# Patient Record
Sex: FEMALE | Race: WHITE | Employment: FULL TIME | URBAN - METROPOLITAN AREA
[De-identification: names, ages, dates, MRNs, and addresses within clinical notes are randomized per-mention and may not be internally consistent; named-entity substitution may affect disease eponyms.]

---

## 2018-03-09 ENCOUNTER — HOSPITAL ENCOUNTER (OUTPATIENT)
Dept: CT IMAGING | Age: 48
Discharge: HOME | End: 2018-03-09
Attending: FAMILY MEDICINE
Payer: COMMERCIAL

## 2018-03-09 DIAGNOSIS — R91.8 ABNORMAL CHEST X-RAY WITH MULTIPLE LUNG NODULES: ICD-10-CM

## 2018-03-09 PROCEDURE — 71250 CT THORAX DX C-: CPT

## 2018-10-06 ENCOUNTER — HOSPITAL ENCOUNTER (OUTPATIENT)
Dept: CT IMAGING | Age: 48
Discharge: HOME | End: 2018-10-06
Attending: FAMILY MEDICINE
Payer: COMMERCIAL

## 2018-10-06 DIAGNOSIS — R91.8 MULTIPLE LUNG NODULES ON CT: ICD-10-CM

## 2018-10-06 PROCEDURE — 71250 CT THORAX DX C-: CPT

## 2018-10-08 NOTE — PROGRESS NOTES
Your CT shows stable and small nodules. Recommend a repeat in 1 year just because of you personal history of cigarette use.

## 2019-01-08 PROBLEM — E66.01 SEVERE OBESITY (HCC): Status: ACTIVE | Noted: 2019-01-08

## 2019-06-26 ENCOUNTER — HOSPITAL ENCOUNTER (OUTPATIENT)
Dept: MAMMOGRAPHY | Age: 49
Discharge: HOME | End: 2019-06-26
Attending: FAMILY MEDICINE
Payer: COMMERCIAL

## 2019-06-26 PROCEDURE — 77067 SCR MAMMO BI INCL CAD: CPT

## 2020-02-05 PROBLEM — R91.8 MULTIPLE LUNG NODULES ON CT: Status: ACTIVE | Noted: 2020-02-05

## 2020-02-05 PROBLEM — Z87.891 HISTORY OF TOBACCO USE: Status: ACTIVE | Noted: 2020-02-05

## 2020-02-05 PROBLEM — N89.8 VAGINAL DRYNESS: Status: ACTIVE | Noted: 2020-02-05

## 2020-02-05 PROBLEM — N39.41 URGE INCONTINENCE: Status: ACTIVE | Noted: 2020-02-05

## 2020-05-06 PROBLEM — R10.9 ABDOMINAL WALL PAIN: Status: ACTIVE | Noted: 2020-05-06

## 2020-05-06 PROBLEM — K59.04 CHRONIC IDIOPATHIC CONSTIPATION: Status: ACTIVE | Noted: 2020-05-06

## 2020-08-27 ENCOUNTER — HOSPITAL ENCOUNTER (OUTPATIENT)
Dept: CT IMAGING | Age: 50
Discharge: HOME | End: 2020-08-27
Attending: FAMILY MEDICINE
Payer: COMMERCIAL

## 2020-08-27 DIAGNOSIS — R91.8 MULTIPLE LUNG NODULES ON CT: ICD-10-CM

## 2020-08-27 PROCEDURE — 71250 CT THORAX DX C-: CPT

## 2021-07-12 PROBLEM — R10.9 ABDOMINAL WALL PAIN: Status: RESOLVED | Noted: 2020-05-06 | Resolved: 2021-07-12

## 2022-02-07 PROBLEM — R74.01 ELEVATED ALT MEASUREMENT: Status: ACTIVE | Noted: 2022-02-07

## 2022-03-18 PROBLEM — E66.01 SEVERE OBESITY (HCC): Status: ACTIVE | Noted: 2019-01-08

## 2022-03-19 PROBLEM — Z87.891 HISTORY OF TOBACCO USE: Status: ACTIVE | Noted: 2020-02-05

## 2022-03-19 PROBLEM — K59.04 CHRONIC IDIOPATHIC CONSTIPATION: Status: ACTIVE | Noted: 2020-05-06

## 2022-03-19 PROBLEM — N39.41 URGE INCONTINENCE: Status: ACTIVE | Noted: 2020-02-05

## 2022-03-19 PROBLEM — N89.8 VAGINAL DRYNESS: Status: ACTIVE | Noted: 2020-02-05

## 2022-03-20 PROBLEM — R74.01 ELEVATED ALT MEASUREMENT: Status: ACTIVE | Noted: 2022-02-07

## 2022-03-20 PROBLEM — R91.8 MULTIPLE LUNG NODULES ON CT: Status: ACTIVE | Noted: 2020-02-05

## 2022-06-22 ENCOUNTER — TELEPHONE (OUTPATIENT)
Dept: FAMILY MEDICINE CLINIC | Facility: CLINIC | Age: 52
End: 2022-06-22

## 2022-06-22 RX ORDER — PANTOPRAZOLE SODIUM 20 MG/1
TABLET, DELAYED RELEASE ORAL
Qty: 90 TABLET | Refills: 0 | Status: SHIPPED | OUTPATIENT
Start: 2022-06-22 | End: 2022-08-15

## 2022-06-22 NOTE — TELEPHONE ENCOUNTER
States that she needs a refill of heartburn medication, but is unsure of the name of this medication. Upon looking at her med list, it seems that she is on Pantoprazole 20 mg. Last OV: 2/3/2022  Next OV: 8/5/2022    Last written: 3/28/2022  For: 90 with 0 refills.

## 2022-06-27 RX ORDER — FLUOXETINE HYDROCHLORIDE 40 MG/1
CAPSULE ORAL
Qty: 180 CAPSULE | Refills: 0 | Status: SHIPPED | OUTPATIENT
Start: 2022-06-27 | End: 2022-09-20

## 2022-07-28 RX ORDER — MONTELUKAST SODIUM 10 MG/1
TABLET ORAL
Qty: 90 TABLET | Refills: 0 | Status: SHIPPED | OUTPATIENT
Start: 2022-07-28 | End: 2022-09-22

## 2022-07-28 RX ORDER — TRAZODONE HYDROCHLORIDE 100 MG/1
TABLET ORAL
Qty: 90 TABLET | Refills: 0 | Status: SHIPPED | OUTPATIENT
Start: 2022-07-28 | End: 2022-09-22

## 2022-07-28 RX ORDER — FOLIC ACID 1 MG/1
TABLET ORAL
Qty: 90 TABLET | Refills: 0 | Status: SHIPPED | OUTPATIENT
Start: 2022-07-28 | End: 2022-09-22

## 2022-08-05 ENCOUNTER — OFFICE VISIT (OUTPATIENT)
Dept: FAMILY MEDICINE CLINIC | Facility: CLINIC | Age: 52
End: 2022-08-05
Payer: COMMERCIAL

## 2022-08-05 VITALS
DIASTOLIC BLOOD PRESSURE: 70 MMHG | HEART RATE: 66 BPM | WEIGHT: 258.4 LBS | OXYGEN SATURATION: 97 % | HEIGHT: 69 IN | SYSTOLIC BLOOD PRESSURE: 122 MMHG | RESPIRATION RATE: 18 BRPM | BODY MASS INDEX: 38.27 KG/M2

## 2022-08-05 DIAGNOSIS — N39.41 URGE INCONTINENCE: ICD-10-CM

## 2022-08-05 DIAGNOSIS — Z87.891 PERSONAL HISTORY OF TOBACCO USE: ICD-10-CM

## 2022-08-05 DIAGNOSIS — Z12.11 COLON CANCER SCREENING: ICD-10-CM

## 2022-08-05 DIAGNOSIS — L85.3 DRY SKIN: ICD-10-CM

## 2022-08-05 DIAGNOSIS — R74.01 ELEVATED ALT MEASUREMENT: Primary | ICD-10-CM

## 2022-08-05 DIAGNOSIS — G47.00 INSOMNIA, UNSPECIFIED TYPE: ICD-10-CM

## 2022-08-05 DIAGNOSIS — L30.4 INTERTRIGO: ICD-10-CM

## 2022-08-05 DIAGNOSIS — F41.9 ANXIETY: ICD-10-CM

## 2022-08-05 DIAGNOSIS — E78.2 MIXED HYPERLIPIDEMIA: ICD-10-CM

## 2022-08-05 DIAGNOSIS — H02.844 EDEMA OF LEFT UPPER EYELID: ICD-10-CM

## 2022-08-05 DIAGNOSIS — F33.42 RECURRENT MAJOR DEPRESSIVE DISORDER, IN FULL REMISSION (HCC): ICD-10-CM

## 2022-08-05 DIAGNOSIS — L24.9 IRRITANT CONTACT DERMATITIS, UNSPECIFIED TRIGGER: ICD-10-CM

## 2022-08-05 DIAGNOSIS — Z12.31 ENCOUNTER FOR SCREENING MAMMOGRAM FOR BREAST CANCER: ICD-10-CM

## 2022-08-05 LAB
ALBUMIN SERPL-MCNC: 3.7 G/DL (ref 3.5–5)
ALBUMIN/GLOB SERPL: 1.2 {RATIO} (ref 1.2–3.5)
ALP SERPL-CCNC: 100 U/L (ref 50–136)
ALT SERPL-CCNC: 28 U/L (ref 12–65)
AST SERPL-CCNC: 19 U/L (ref 15–37)
BILIRUB DIRECT SERPL-MCNC: <0.1 MG/DL
BILIRUB SERPL-MCNC: 0.3 MG/DL (ref 0.2–1.1)
FERRITIN SERPL-MCNC: 31 NG/ML (ref 8–388)
GLOBULIN SER CALC-MCNC: 3.2 G/DL (ref 2.3–3.5)
HBV SURFACE AG SER QL: NONREACTIVE
HCV AB SER QL: NONREACTIVE
IRON SERPL-MCNC: 46 UG/DL (ref 35–150)
PROT SERPL-MCNC: 6.9 G/DL (ref 6.3–8.2)
T4 FREE SERPL-MCNC: 1 NG/DL (ref 0.78–1.46)
TSH, 3RD GENERATION: 2.54 UIU/ML (ref 0.36–3.74)

## 2022-08-05 PROCEDURE — G8427 DOCREV CUR MEDS BY ELIG CLIN: HCPCS | Performed by: FAMILY MEDICINE

## 2022-08-05 PROCEDURE — G8417 CALC BMI ABV UP PARAM F/U: HCPCS | Performed by: FAMILY MEDICINE

## 2022-08-05 PROCEDURE — 1036F TOBACCO NON-USER: CPT | Performed by: FAMILY MEDICINE

## 2022-08-05 PROCEDURE — 99214 OFFICE O/P EST MOD 30 MIN: CPT | Performed by: FAMILY MEDICINE

## 2022-08-05 PROCEDURE — 3017F COLORECTAL CA SCREEN DOC REV: CPT | Performed by: FAMILY MEDICINE

## 2022-08-05 RX ORDER — NYSTATIN 100000 [USP'U]/G
POWDER TOPICAL
Qty: 60 G | Refills: 5 | Status: SHIPPED | OUTPATIENT
Start: 2022-08-05

## 2022-08-05 RX ORDER — BETAMETHASONE VALERATE 0.1 %
LOTION (ML) TOPICAL
Qty: 60 ML | Refills: 1 | Status: SHIPPED | OUTPATIENT
Start: 2022-08-05

## 2022-08-05 ASSESSMENT — PATIENT HEALTH QUESTIONNAIRE - PHQ9
SUM OF ALL RESPONSES TO PHQ QUESTIONS 1-9: 0
SUM OF ALL RESPONSES TO PHQ QUESTIONS 1-9: 0
1. LITTLE INTEREST OR PLEASURE IN DOING THINGS: 0
2. FEELING DOWN, DEPRESSED OR HOPELESS: 0
SUM OF ALL RESPONSES TO PHQ9 QUESTIONS 1 & 2: 0
SUM OF ALL RESPONSES TO PHQ QUESTIONS 1-9: 0
SUM OF ALL RESPONSES TO PHQ QUESTIONS 1-9: 0

## 2022-08-05 ASSESSMENT — ANXIETY QUESTIONNAIRES
GAD7 TOTAL SCORE: 4
1. FEELING NERVOUS, ANXIOUS, OR ON EDGE: 0
3. WORRYING TOO MUCH ABOUT DIFFERENT THINGS: 1
4. TROUBLE RELAXING: 0
5. BEING SO RESTLESS THAT IT IS HARD TO SIT STILL: 1
IF YOU CHECKED OFF ANY PROBLEMS ON THIS QUESTIONNAIRE, HOW DIFFICULT HAVE THESE PROBLEMS MADE IT FOR YOU TO DO YOUR WORK, TAKE CARE OF THINGS AT HOME, OR GET ALONG WITH OTHER PEOPLE: SOMEWHAT DIFFICULT
7. FEELING AFRAID AS IF SOMETHING AWFUL MIGHT HAPPEN: 1
2. NOT BEING ABLE TO STOP OR CONTROL WORRYING: 1
6. BECOMING EASILY ANNOYED OR IRRITABLE: 0

## 2022-08-05 ASSESSMENT — ENCOUNTER SYMPTOMS
CONSTIPATION: 1
BLOOD IN STOOL: 0
SHORTNESS OF BREATH: 0
NAUSEA: 0
ABDOMINAL PAIN: 0
COUGH: 0
VOMITING: 0

## 2022-08-05 NOTE — PATIENT INSTRUCTIONS

## 2022-08-05 NOTE — PROGRESS NOTES
[address] [facility],[address], DO  [address], [address] [facility] [address]   No:  [phone]  Fax:  [phone]        Assessment/Plan:   Ren Soulier was seen today for anxiety and gastroesophageal reflux. Diagnoses and all orders for this visit:    Elevated ALT measurement  She had very slight elevation in ALT at last appointment. Await repeat today with lab work. -     TSH; Future  -     T4, Free; Future  -     Hepatitis B Surface Antigen; Future  -     Hepatitis C Antibody; Future  -     Hepatic Function Panel; Future  -     Ferritin; Future  -     Iron; Future  -     Soluble transferrin receptor; Future  -     Soluble transferrin receptor  -     Iron  -     Ferritin  -     Hepatic Function Panel  -     Hepatitis C Antibody  -     Hepatitis B Surface Antigen  -     T4, Free  -     TSH    Urge incontinence  Continue Vesicare. Advised patient this could be contributing some to her dry mucous membranes, but she does feel that it is efficacious for her bladder would like to continue. Irritant contact dermatitis, unspecified trigger  Prescribing betamethasone. Advise she also use Vaseline to the irritated areas at night. She is trying to use barriers between her bra and then when at work because she has such a physical and hot job  -     betamethasone valerate (VALISONE) 0.1 % lotion; Use topically twice daily as needed to irritated or itchy skin/rash. Do not use for more than 7 days in a row    Intertrigo  Patient to apply nystatin to the skin fold as needed. -     nystatin (MYCOSTATIN) 660163 UNIT/GM powder; Apply powder to skin folds 3 times a day as needed for rash    Dry skin  Await thyroid levels. -     TSH; Future  -     T4, Free; Future  -     Hepatitis B Surface Antigen; Future  -     Hepatitis C Antibody; Future  -     Hepatic Function Panel; Future  -     Ferritin; Future  -     Iron; Future  -     Soluble transferrin receptor;  Future  -     Soluble transferrin receptor  -     Iron  -     Ferritin  -     Hepatic Function Panel  -     Hepatitis C Antibody  -     Hepatitis B Surface Antigen  -     T4, Free  -     TSH    Edema of left upper eyelid  Mild edema noted on exam today, no mass appreciated. Advised patient to follow-up with ophthalmology for further recommendations, advised cool compress and continue allergy medications    Personal history of tobacco use  She has history of multiple lung nodules that were found to be benign after serial follow-up which was completed in 2020. However she quit smoking 2016 and she is agreeable to LDCT lung per guidelines. -     CT Lung Screen (Annual); Future    Recurrent major depressive disorder, in full remission ([facility] [address]  Stable. Continue trazodone, fluoxetine    Anxiety  Stable. Continue fluoxetine. She is going to see what psychiatrist is in her network and contact the office for referral if needed. She will also contact the office if she would like to see a counselor    Colon cancer screening  -     [facility]    Encounter for screening mammogram for breast cancer  -     [facility] Digital Screen Bilateral [[id number]]; Future    Insomnia, unspecified type  Stable. Continue trazodone. Mixed hyperlipidemia  She had lipid panel drawn 6 months ago. assess again at follow-up in 6 months. Kristine Arce is a 49 y.o. female who is seen for evaluation of   Chief Complaint   Patient presents with    Anxiety    Gastroesophageal Reflux       HPI:   She is here today to follow-up chronic issues including anxiety, depression, acid reflux. She is looking to get a new job. She is looking for a place that will pay more. She is probably going to stay at [facility] part-time at least.  If she is agreeable to screening as long as it is affordable. She has some swelling of the left upper eyelid. It is worse in the morning and decreases by the end of the day.   She does not feel a mass or pain or itch.  She does flush her eyes quite a bit at work, but her right eye has not bothered. She has rash under her breasts, in her groin skin folds and then on her neck in the side of her body where her bra is. She is tried different bras, she has tried different types of material.  She says a lot of this is due to working in a hot environment and a lot of sweating. She works very hard to stay hydrated through water, Gatorade, electrolyte popsicles, frozen watermelon and watermelon. She is still struggling some with anxiety and depression. She did not end up seeing psychiatry. She states it was too expensive. She is not sure for somebody else in her network but she will look into that. She will think about counseling. She is having a lot of difficulty with dry skin especially her lips. Review of Systems:  Review of Systems   Constitutional:  Negative for chills, fever and unexpected weight change. Respiratory:  Negative for cough and shortness of breath. Cardiovascular:  Negative for chest pain and leg swelling. Gastrointestinal:  Positive for constipation (chronic). Negative for abdominal pain, blood in stool, nausea and vomiting. Skin:  Positive for rash.        History:  Past Medical History:   Diagnosis Date    Abnormal Pap smear of cervix     Anxiety     Depression     Hypertension     Lung nodule     BENIGN, stable with serial 2 yr f/u     Obesity        Past Surgical History:   Procedure Laterality Date    BUNIONECTOMY  2005     SECTION      x 3    GYN      Cervical cryosurgery for abnormal cells    IN ABDOMEN SURGERY PROC UNLISTED      after wieght loss    TONSILLECTOMY AND ADENOIDECTOMY      TUBAL LIGATION         Current Outpatient Medications   Medication Sig Dispense Refill    nystatin (MYCOSTATIN) 427684 UNIT/GM powder Apply powder to skin folds 3 times a day as needed for rash 60 g 5    betamethasone valerate (VALISONE) 0.1 % lotion Use topically twice daily as needed to 08/05/22 0852   BP: 122/70   Site: Left Upper Arm   Position: Sitting   Pulse: 66   Resp: 18   SpO2: 97%   Weight: 258 lb 6.4 oz (117.2 kg)   Height: 5' 9\" (1.753 m)          Physical Exam:  Physical Exam  Vitals reviewed. Constitutional:       Appearance: Normal appearance. HENT:      Head: Normocephalic and atraumatic. Cardiovascular:      Rate and Rhythm: Normal rate and regular rhythm. Heart sounds: No murmur heard. Pulmonary:      Effort: Pulmonary effort is normal. No respiratory distress. Breath sounds: Normal breath sounds. No wheezing or rhonchi. Abdominal:      General: There is no distension. Palpations: There is no mass. Tenderness: There is no abdominal tenderness. There is no right CVA tenderness, left CVA tenderness, guarding or rebound. Hernia: No hernia is present. Musculoskeletal:      Cervical back: Neck supple. Right lower leg: No edema. Left lower leg: No edema. Lymphadenopathy:      Cervical: No cervical adenopathy. Skin:     General: Skin is warm and dry. Comments: Linear rash on the right lateral neck where her sports bra lies. Pink rash on the left wrist where her watch lies. Bumpy erythematous rash under the breast.   Neurological:      Mental Status: She is alert. Psychiatric:         Mood and Affect: Mood normal.         Behavior: Behavior normal.           Kindra Santoyo DO    This note was generated using Dragon voice recognition software.   There may be medical errors due to computer generated translation

## 2022-08-07 LAB — TRANSFERRIN SERPL-SCNC: 20.6 NMOL/L (ref 12.2–27.3)

## 2022-08-15 RX ORDER — PANTOPRAZOLE SODIUM 20 MG/1
TABLET, DELAYED RELEASE ORAL
Qty: 90 TABLET | Refills: 1 | Status: SHIPPED | OUTPATIENT
Start: 2022-08-15

## 2022-09-20 RX ORDER — FLUOXETINE HYDROCHLORIDE 40 MG/1
CAPSULE ORAL
Qty: 180 CAPSULE | Refills: 1 | Status: SHIPPED | OUTPATIENT
Start: 2022-09-20

## 2022-09-22 RX ORDER — MONTELUKAST SODIUM 10 MG/1
TABLET ORAL
Qty: 90 TABLET | Refills: 0 | Status: SHIPPED | OUTPATIENT
Start: 2022-09-22

## 2022-09-22 RX ORDER — FOLIC ACID 1 MG/1
TABLET ORAL
Qty: 90 TABLET | Refills: 0 | Status: SHIPPED | OUTPATIENT
Start: 2022-09-22

## 2022-09-22 RX ORDER — TRAZODONE HYDROCHLORIDE 100 MG/1
TABLET ORAL
Qty: 90 TABLET | Refills: 0 | Status: SHIPPED | OUTPATIENT
Start: 2022-09-22

## 2022-10-14 RX ORDER — PANTOPRAZOLE SODIUM 20 MG/1
TABLET, DELAYED RELEASE ORAL
Qty: 90 TABLET | Refills: 0 | Status: CANCELLED | OUTPATIENT
Start: 2022-10-14

## 2022-10-14 RX ORDER — SOLIFENACIN SUCCINATE 5 MG/1
5 TABLET, FILM COATED ORAL DAILY
Qty: 90 TABLET | Refills: 0 | Status: SHIPPED | OUTPATIENT
Start: 2022-10-14

## 2022-10-14 NOTE — TELEPHONE ENCOUNTER
Pt called for refill on Protonix and Vesicare.   [place], [place]  Protonix sent 8/15/22 # 90 1 refill  Vesicare sent 4/28/22 # 90 no refill  Appt 2/7

## 2022-10-14 NOTE — TELEPHONE ENCOUNTER
90-day supply with a refill of pantoprazole was sent to her mail order pharmacy 2 months ago. Is she no longer using the mail order pharmacy?

## 2022-10-18 RX ORDER — ESTRADIOL 10 UG/1
INSERT VAGINAL
Qty: 24 TABLET | Refills: 0 | Status: SHIPPED | OUTPATIENT
Start: 2022-10-18 | End: 2022-12-07

## 2022-12-07 RX ORDER — ESTRADIOL 10 UG/1
INSERT VAGINAL
Qty: 24 TABLET | Refills: 0 | Status: SHIPPED | OUTPATIENT
Start: 2022-12-07

## 2022-12-16 RX ORDER — MONTELUKAST SODIUM 10 MG/1
TABLET ORAL
Qty: 90 TABLET | Refills: 0 | Status: SHIPPED | OUTPATIENT
Start: 2022-12-16

## 2022-12-16 RX ORDER — TRAZODONE HYDROCHLORIDE 100 MG/1
TABLET ORAL
Qty: 90 TABLET | Refills: 0 | Status: SHIPPED | OUTPATIENT
Start: 2022-12-16

## 2022-12-16 RX ORDER — FOLIC ACID 1 MG/1
TABLET ORAL
Qty: 90 TABLET | Refills: 0 | Status: SHIPPED | OUTPATIENT
Start: 2022-12-16

## 2023-01-23 RX ORDER — SOLIFENACIN SUCCINATE 5 MG/1
TABLET, FILM COATED ORAL
Qty: 90 TABLET | Refills: 0 | Status: SHIPPED | OUTPATIENT
Start: 2023-01-23

## 2023-02-07 ENCOUNTER — OFFICE VISIT (OUTPATIENT)
Dept: FAMILY MEDICINE CLINIC | Facility: CLINIC | Age: 53
End: 2023-02-07
Payer: COMMERCIAL

## 2023-02-07 VITALS
HEART RATE: 75 BPM | WEIGHT: 280 LBS | BODY MASS INDEX: 41.47 KG/M2 | DIASTOLIC BLOOD PRESSURE: 88 MMHG | SYSTOLIC BLOOD PRESSURE: 138 MMHG | OXYGEN SATURATION: 97 % | RESPIRATION RATE: 16 BRPM | HEIGHT: 69 IN

## 2023-02-07 DIAGNOSIS — G47.00 INSOMNIA, UNSPECIFIED TYPE: ICD-10-CM

## 2023-02-07 DIAGNOSIS — Z87.891 HISTORY OF TOBACCO USE: ICD-10-CM

## 2023-02-07 DIAGNOSIS — Z12.11 COLON CANCER SCREENING: ICD-10-CM

## 2023-02-07 DIAGNOSIS — I10 ESSENTIAL HYPERTENSION: Primary | ICD-10-CM

## 2023-02-07 DIAGNOSIS — F41.9 ANXIETY: ICD-10-CM

## 2023-02-07 DIAGNOSIS — F33.0 MILD EPISODE OF RECURRENT MAJOR DEPRESSIVE DISORDER (HCC): ICD-10-CM

## 2023-02-07 DIAGNOSIS — E66.01 OBESITY, CLASS III, BMI 40-49.9 (MORBID OBESITY) (HCC): ICD-10-CM

## 2023-02-07 DIAGNOSIS — E78.2 MIXED HYPERLIPIDEMIA: ICD-10-CM

## 2023-02-07 PROBLEM — F33.42 RECURRENT MAJOR DEPRESSIVE DISORDER, IN FULL REMISSION (HCC): Status: ACTIVE | Noted: 2023-02-07

## 2023-02-07 PROBLEM — R74.01 ELEVATED ALT MEASUREMENT: Status: RESOLVED | Noted: 2022-02-07 | Resolved: 2023-02-07

## 2023-02-07 LAB
BASOPHILS # BLD: 0 K/UL (ref 0–0.2)
BASOPHILS NFR BLD: 1 % (ref 0–2)
DIFFERENTIAL METHOD BLD: NORMAL
EOSINOPHIL # BLD: 0.2 K/UL (ref 0–0.8)
EOSINOPHIL NFR BLD: 3 % (ref 0.5–7.8)
ERYTHROCYTE [DISTWIDTH] IN BLOOD BY AUTOMATED COUNT: 12.9 % (ref 11.9–14.6)
EST. AVERAGE GLUCOSE BLD GHB EST-MCNC: 108 MG/DL
HBA1C MFR BLD: 5.4 % (ref 4.8–5.6)
HCT VFR BLD AUTO: 38.2 % (ref 35.8–46.3)
HGB BLD-MCNC: 12.3 G/DL (ref 11.7–15.4)
IMM GRANULOCYTES # BLD AUTO: 0 K/UL (ref 0–0.5)
IMM GRANULOCYTES NFR BLD AUTO: 0 % (ref 0–5)
LYMPHOCYTES # BLD: 1.5 K/UL (ref 0.5–4.6)
LYMPHOCYTES NFR BLD: 30 % (ref 13–44)
MCH RBC QN AUTO: 29.8 PG (ref 26.1–32.9)
MCHC RBC AUTO-ENTMCNC: 32.2 G/DL (ref 31.4–35)
MCV RBC AUTO: 92.5 FL (ref 82–102)
MONOCYTES # BLD: 0.4 K/UL (ref 0.1–1.3)
MONOCYTES NFR BLD: 7 % (ref 4–12)
NEUTS SEG # BLD: 2.8 K/UL (ref 1.7–8.2)
NEUTS SEG NFR BLD: 59 % (ref 43–78)
NRBC # BLD: 0 K/UL (ref 0–0.2)
PLATELET # BLD AUTO: 290 K/UL (ref 150–450)
PMV BLD AUTO: 12.2 FL (ref 9.4–12.3)
RBC # BLD AUTO: 4.13 M/UL (ref 4.05–5.2)
WBC # BLD AUTO: 4.9 K/UL (ref 4.3–11.1)

## 2023-02-07 PROCEDURE — G8417 CALC BMI ABV UP PARAM F/U: HCPCS | Performed by: FAMILY MEDICINE

## 2023-02-07 PROCEDURE — 3017F COLORECTAL CA SCREEN DOC REV: CPT | Performed by: FAMILY MEDICINE

## 2023-02-07 PROCEDURE — 99214 OFFICE O/P EST MOD 30 MIN: CPT | Performed by: FAMILY MEDICINE

## 2023-02-07 PROCEDURE — 3078F DIAST BP <80 MM HG: CPT | Performed by: FAMILY MEDICINE

## 2023-02-07 PROCEDURE — G8427 DOCREV CUR MEDS BY ELIG CLIN: HCPCS | Performed by: FAMILY MEDICINE

## 2023-02-07 PROCEDURE — 1036F TOBACCO NON-USER: CPT | Performed by: FAMILY MEDICINE

## 2023-02-07 PROCEDURE — G8484 FLU IMMUNIZE NO ADMIN: HCPCS | Performed by: FAMILY MEDICINE

## 2023-02-07 PROCEDURE — 3074F SYST BP LT 130 MM HG: CPT | Performed by: FAMILY MEDICINE

## 2023-02-07 RX ORDER — HYDROCHLOROTHIAZIDE 25 MG/1
25 TABLET ORAL EVERY MORNING
Qty: 90 TABLET | Refills: 1 | Status: SHIPPED | OUTPATIENT
Start: 2023-02-07

## 2023-02-07 SDOH — ECONOMIC STABILITY: FOOD INSECURITY: WITHIN THE PAST 12 MONTHS, YOU WORRIED THAT YOUR FOOD WOULD RUN OUT BEFORE YOU GOT MONEY TO BUY MORE.: SOMETIMES TRUE

## 2023-02-07 SDOH — ECONOMIC STABILITY: HOUSING INSECURITY
IN THE LAST 12 MONTHS, WAS THERE A TIME WHEN YOU DID NOT HAVE A STEADY PLACE TO SLEEP OR SLEPT IN A SHELTER (INCLUDING NOW)?: NO

## 2023-02-07 SDOH — ECONOMIC STABILITY: FOOD INSECURITY: WITHIN THE PAST 12 MONTHS, THE FOOD YOU BOUGHT JUST DIDN'T LAST AND YOU DIDN'T HAVE MONEY TO GET MORE.: NEVER TRUE

## 2023-02-07 SDOH — ECONOMIC STABILITY: INCOME INSECURITY: HOW HARD IS IT FOR YOU TO PAY FOR THE VERY BASICS LIKE FOOD, HOUSING, MEDICAL CARE, AND HEATING?: SOMEWHAT HARD

## 2023-02-07 ASSESSMENT — ENCOUNTER SYMPTOMS
NAUSEA: 0
BLOOD IN STOOL: 0
COUGH: 0
VOMITING: 0
ABDOMINAL PAIN: 0

## 2023-02-07 NOTE — PROGRESS NOTES
[address] [facility],[address], DO  [address], [address] [facility] [address]  Ph No:  [phone]  Fax:  [phone]        Assessment/Plan:   Brian Lyons was seen today for anxiety, depression and weight management. Diagnoses and all orders for this visit:    Essential hypertension  New problem. On recheck today blood pressure 13 8/88. She notes elevated blood pressure between appointments. Starting HCTZ. Follow-up in 6 weeks to reevaluate. Await today's nonfasting labs. -     CBC with Auto Differential; Future  -     Comprehensive Metabolic Panel; Future  -     Lipid Panel; Future  -     Hemoglobin A1C; Future  -     TSH; Future  -     hydroCHLOROthiazide (HYDRODIURIL) 25 MG tablet; Take 1 tablet by mouth every morning  -     TSH  -     Hemoglobin A1C  -     Lipid Panel  -     Comprehensive Metabolic Panel  -     CBC with Auto Differential    Mild episode of recurrent major depressive disorder (HCC)  Currently using fluoxetine and trazodone. She is concerned that weight gain is causing the problems with mood. Though, we did discuss today that the uncontrolled mood does not help with weight loss especially if she is using food as comfort. Referring her to psychiatry and counseling. Advised patient she can also reach out to her previous counselor if she prefers. -     External Referral to Psychiatry  -     External Referral To Counseling Services    Anxiety  Continue fluoxetine and trazodone. Referring her to psychiatry and counseling. Obesity, Class III, BMI 40-49.9 (morbid obesity) ([facility] [address]  Referring her to bariatric surgeon. Did discuss that I feel that getting improvement in her mood is important at this time to help with some of her motivation. Advised patient that medications are available for weight loss such as UBFCAX, but she should see if this is covered by her insurance  -     CBC with Auto Differential; Future  -     Comprehensive Metabolic Panel;  Future  -     Lipid Panel; Future  -     Hemoglobin A1C; Future  -     TSH; Future  -     External Referral to Psychiatry  -     [facility]  -     TSH  -     Hemoglobin A1C  -     Lipid Panel  -     Comprehensive Metabolic Panel  -     CBC with Auto Differential    Mixed hyperlipidemia  Await today's nonfasting labs. Insomnia, unspecified type  Continue trazodone. Colon cancer screening  Provided her with the contact information for gastroenterology to schedule colonoscopy    History of tobacco use  History of serial CTs for multiple lung nodules with history of smoking. She quit smoking in 2016. She completed the serial CTs, benign findings however she still qualifies for lung cancer screening. This was ordered several months ago. She was provided the contact information to the hospital to schedule the CT. Labs:  No results found for this visit on 02/07/23. Office Visit on 08/05/2022   Component Date Value Ref Range Status    Soluble Transferrin Recept 08/05/2022 20.6  12.2 - 27.3 nmol/L Final    Comment: (NOTE)  Performed At: [facility] & [address] [id number]  Mani Adam MD [id number]      Iron 08/05/2022 46  35 - 150 ug/dL Final    Comment: Known Interfering Substances section:  \"Iron values may be falsely elevated in  serum samples from patients with  anticoagulants (e.g., hemodialysis patients). \"  Limitations of Procedure section:  \"Turbidity resulting from precipitation of  fibrinogen in the serum of patients treated  with anticoagulants (e.g. hemodialysis  patients) may cause spuriously elevated  iron results. \"      Ferritin 08/05/2022 31  8 - 388 NG/ML Final    Total Protein 08/05/2022 6.9  6.3 - 8.2 g/dL Final    Albumin 08/05/2022 3.7  3.5 - 5.0 g/dL Final    Globulin 08/05/2022 3.2  2.3 - 3.5 g/dL Final    Albumin/Globulin Ratio 08/05/2022 1.2  1.2 - 3.5   Final    Total Bilirubin 08/05/2022 0.3  0.2 - 1.1 MG/DL Final    Bilirubin, Direct 08/05/2022 <0.1  <0.4 MG/DL Final    Alk Phosphatase 08/05/2022 100  50 - 136 U/L Final    AST 08/05/2022 19  15 - 37 U/L Final    ALT 08/05/2022 28  12 - 65 U/L Final    Hepatitis C Ab 08/05/2022 NONREACTIVE  NONREACTIVE   Final    Hepatitis B Surface Ag 08/05/2022 NONREACTIVE  NONREACTIVE   Final    T4 Free 08/05/2022 1.0  0.78 - 1.46 NG/DL Final    TSH, 3RD GENERATION 08/05/2022 2.540  0.358 - 3.740 uIU/mL Final           Adriana Kurtz is a 52 y.o. female who is seen for evaluation of   Chief Complaint   Patient presents with    Anxiety    Depression    Weight Management       HPI:   She feels that her blood pressure has been elevated.  She is having headaches.  She states the other day she checked her blood pressure was 146 systolically.  She does not have a home blood pressure monitor.  But she did check it at [facility].  She is gained weight.  She feels that the weight gain is affecting her mood.  But she also notes that lack of motivation is affecting her ability to lose weight, exercise, eat right.  She notes a \"lack of willpower\".  She states her family will cook or bring in foods that do not help her with weight loss and she will consume it.  She is try the adrian NOOM but still feels that it was not beneficial for helping her weight loss.  She did not end up reaching out to psychiatry as she had intended to do so at last appointment.  She does feel that the fluoxetine is overall beneficial compared to previous.  She states that there is some concerns for bills but she is not stressing out about them like she would have in the past without the medication.  She does note that cost is somewhat of a concern in regards to seeing specialist.  She did not schedule mammogram or colonoscopy yet.  She has seen a counselor in the past.  She has not seen the counselor in a while.  She does not remember the name but she knows where the office is to reach out to if needed.  She states that her counselor was  concerned that she has history of PTSD from childhood trauma. Review of Systems:  Review of Systems   Constitutional:  Positive for fatigue. Negative for chills and fever. Respiratory:  Negative for cough. Cardiovascular:  Negative for chest pain. Gastrointestinal:  Negative for abdominal pain, blood in stool, nausea and vomiting. History:  Past Medical History:   Diagnosis Date    Abnormal Pap smear of cervix     Anxiety     Depression     Hypertension     Lung nodule     BENIGN, stable with serial 2 yr f/u     Obesity        Past Surgical History:   Procedure Laterality Date    BUNIONECTOMY  2005     SECTION      x 3    GYN      Cervical cryosurgery for abnormal cells    MT UNLISTED PROCEDURE ABDOMEN PERITONEUM & OMENTUM      after wieght loss    TONSILLECTOMY AND ADENOIDECTOMY      TUBAL LIGATION         Current Outpatient Medications   Medication Sig Dispense Refill    hydroCHLOROthiazide (HYDRODIURIL) 25 MG tablet Take 1 tablet by mouth every morning 90 tablet 1    solifenacin (VESICARE) 5 MG tablet TAKE 1 TABLET BY MOUTH  DAILY FOR A CONDITION WHERE THE URGE TO URINATE RESULTS IN URINE LEAKAGE 90 tablet 0    montelukast (SINGULAIR) 10 MG tablet TAKE 1 TABLET BY MOUTH AT  NIGHT FOR ALLERGIES 90 tablet 0    traZODone (DESYREL) 100 MG tablet TAKE 1 TABLET BY MOUTH AT  NIGHT 90 tablet 0    folic acid (FOLVITE) 1 MG tablet TAKE 1 TABLET BY MOUTH  DAILY FOR INADEQUATE FOLIC  ACID 90 tablet 0    FLUoxetine (PROZAC) 40 MG capsule TAKE 1 CAPSULE BY MOUTH  TWICE DAILY 180 capsule 1    pantoprazole (PROTONIX) 20 MG tablet TAKE 1 TABLET BY MOUTH  DAILY BEFORE BREAKFAST FOR  HEARTBURN 90 tablet 1    nystatin (MYCOSTATIN) 896888 UNIT/GM powder Apply powder to skin folds 3 times a day as needed for rash 60 g 5    betamethasone valerate (VALISONE) 0.1 % lotion Use topically twice daily as needed to irritated or itchy skin/rash.   Do not use for more than 7 days in a row 60 mL 1    albuterol sulfate HFA 108 (90 Base) MCG/ACT inhaler Inhale 2 puffs into the lungs every 4 hours as needed      albuterol (PROVENTIL) (2.5 MG/3ML) 0.083% nebulizer solution Inhale 2.5 mg into the lungs every 4 hours as needed      ascorbic acid (VITAMIN C) 500 MG tablet Take 500 mg by mouth daily      fexofenadine (ALLEGRA) 180 MG tablet Take 180 mg by mouth daily as needed      fluticasone (FLONASE) 50 MCG/ACT nasal spray 2 sprays by Nasal route daily       No current facility-administered medications for this visit. Immunization History   Administered Date(s) Administered    Influenza Virus Vaccine 10/22/2018, 02/05/2020    Influenza, FLUARIX, FLULAVAL, FLUZONE (age 10 mo+) AND AFLURIA, (age 1 y+), PF, 0.5mL 02/05/2020    Influenza, FLUCELVAX, (age 10 mo+), MDCK, PF, 0.5mL 10/04/2021    Influenza, Triv, 3 Years and older, IM, PF (Afluria 5yrs and older) 10/14/2016    Influenza, Trivalent PF 10/14/2016    Tdap (Boostrix, Adacel) 07/05/2018, 11/03/2020    Zoster Recombinant (Shingrix) 02/03/2022, 04/04/2022             Vitals:    Vitals:    02/07/23 0853 02/07/23 0912   BP: 124/76 138/88   Site: Left Upper Arm    Position: Sitting    Pulse: 75    Resp: 16    SpO2: 97%    Weight: 280 lb (127 kg)    Height: 5' 9\" (1.753 m)           Physical Exam:  Physical Exam  Vitals reviewed. Constitutional:       Appearance: Normal appearance. HENT:      Head: Normocephalic and atraumatic. Cardiovascular:      Rate and Rhythm: Normal rate and regular rhythm. Heart sounds: No murmur heard. Pulmonary:      Effort: Pulmonary effort is normal. No respiratory distress. Breath sounds: Normal breath sounds. No wheezing or rhonchi. Abdominal:      General: There is no distension. Palpations: There is no mass. Tenderness: There is no abdominal tenderness. There is no right CVA tenderness, left CVA tenderness, guarding or rebound. Hernia: No hernia is present. Musculoskeletal:      Cervical back: Neck supple.       Right lower leg: No edema. Left lower leg: No edema. Lymphadenopathy:      Cervical: No cervical adenopathy. Skin:     General: Skin is warm and dry. Neurological:      Mental Status: She is alert. Psychiatric:         Mood and Affect: Mood normal.         Behavior: Behavior normal.           Almas Rausch DO    This note was generated using Dragon voice recognition software.   There may be medical errors due to computer generated translation

## 2023-02-07 NOTE — PATIENT INSTRUCTIONS
Contact the hospital to schedule the CT scan of the lungs and mammogram.  Contact [facility] for psychiatry appointment. Contact [facility] to schedule the colonoscopy. Some of these are screening and should be covered quite well by your insurance.

## 2023-02-08 LAB
ALBUMIN SERPL-MCNC: 3.8 G/DL (ref 3.5–5)
ALBUMIN/GLOB SERPL: 1.2 (ref 0.4–1.6)
ALP SERPL-CCNC: 94 U/L (ref 50–136)
ALT SERPL-CCNC: 21 U/L (ref 12–65)
ANION GAP SERPL CALC-SCNC: 9 MMOL/L (ref 2–11)
AST SERPL-CCNC: 15 U/L (ref 15–37)
BILIRUB SERPL-MCNC: 0.3 MG/DL (ref 0.2–1.1)
BUN SERPL-MCNC: 15 MG/DL (ref 6–23)
CALCIUM SERPL-MCNC: 9.4 MG/DL (ref 8.3–10.4)
CHLORIDE SERPL-SCNC: 107 MMOL/L (ref 101–110)
CHOLEST SERPL-MCNC: 245 MG/DL
CO2 SERPL-SCNC: 25 MMOL/L (ref 21–32)
CREAT SERPL-MCNC: 0.9 MG/DL (ref 0.6–1)
GLOBULIN SER CALC-MCNC: 3.3 G/DL (ref 2.8–4.5)
GLUCOSE SERPL-MCNC: 100 MG/DL (ref 65–100)
HDLC SERPL-MCNC: 51 MG/DL (ref 40–60)
HDLC SERPL: 4.8
LDLC SERPL CALC-MCNC: 163 MG/DL
POTASSIUM SERPL-SCNC: 4.6 MMOL/L (ref 3.5–5.1)
PROT SERPL-MCNC: 7.1 G/DL (ref 6.3–8.2)
SODIUM SERPL-SCNC: 141 MMOL/L (ref 133–143)
TRIGL SERPL-MCNC: 155 MG/DL (ref 35–150)
TSH, 3RD GENERATION: 2.74 UIU/ML (ref 0.36–3.74)
VLDLC SERPL CALC-MCNC: 31 MG/DL (ref 6–23)

## 2023-02-22 RX ORDER — PANTOPRAZOLE SODIUM 20 MG/1
TABLET, DELAYED RELEASE ORAL
Qty: 90 TABLET | Refills: 0 | Status: SHIPPED | OUTPATIENT
Start: 2023-02-22

## 2023-02-23 RX ORDER — ESTRADIOL 10 UG/1
INSERT VAGINAL
Qty: 24 TABLET | Refills: 3 | OUTPATIENT
Start: 2023-02-23

## 2023-03-12 RX ORDER — MONTELUKAST SODIUM 10 MG/1
TABLET ORAL
Qty: 90 TABLET | Refills: 0 | Status: SHIPPED | OUTPATIENT
Start: 2023-03-12

## 2023-03-12 RX ORDER — FOLIC ACID 1 MG/1
TABLET ORAL
Qty: 90 TABLET | Refills: 0 | Status: SHIPPED | OUTPATIENT
Start: 2023-03-12

## 2023-03-12 RX ORDER — TRAZODONE HYDROCHLORIDE 100 MG/1
TABLET ORAL
Qty: 90 TABLET | Refills: 0 | Status: SHIPPED | OUTPATIENT
Start: 2023-03-12

## 2023-03-12 RX ORDER — FLUOXETINE HYDROCHLORIDE 40 MG/1
CAPSULE ORAL
Qty: 180 CAPSULE | Refills: 0 | Status: SHIPPED | OUTPATIENT
Start: 2023-03-12

## 2023-03-29 ENCOUNTER — OFFICE VISIT (OUTPATIENT)
Dept: FAMILY MEDICINE CLINIC | Facility: CLINIC | Age: 53
End: 2023-03-29
Payer: COMMERCIAL

## 2023-03-29 VITALS
OXYGEN SATURATION: 99 % | HEIGHT: 69 IN | SYSTOLIC BLOOD PRESSURE: 136 MMHG | DIASTOLIC BLOOD PRESSURE: 88 MMHG | BODY MASS INDEX: 42.45 KG/M2 | HEART RATE: 84 BPM | WEIGHT: 286.6 LBS | RESPIRATION RATE: 16 BRPM

## 2023-03-29 DIAGNOSIS — I10 ESSENTIAL HYPERTENSION: ICD-10-CM

## 2023-03-29 DIAGNOSIS — H65.01 NON-RECURRENT ACUTE SEROUS OTITIS MEDIA OF RIGHT EAR: Primary | ICD-10-CM

## 2023-03-29 DIAGNOSIS — J30.2 SEASONAL ALLERGIES: ICD-10-CM

## 2023-03-29 PROCEDURE — G8417 CALC BMI ABV UP PARAM F/U: HCPCS | Performed by: FAMILY MEDICINE

## 2023-03-29 PROCEDURE — 3017F COLORECTAL CA SCREEN DOC REV: CPT | Performed by: FAMILY MEDICINE

## 2023-03-29 PROCEDURE — 1036F TOBACCO NON-USER: CPT | Performed by: FAMILY MEDICINE

## 2023-03-29 PROCEDURE — 3079F DIAST BP 80-89 MM HG: CPT | Performed by: FAMILY MEDICINE

## 2023-03-29 PROCEDURE — 99214 OFFICE O/P EST MOD 30 MIN: CPT | Performed by: FAMILY MEDICINE

## 2023-03-29 PROCEDURE — 3075F SYST BP GE 130 - 139MM HG: CPT | Performed by: FAMILY MEDICINE

## 2023-03-29 PROCEDURE — G8427 DOCREV CUR MEDS BY ELIG CLIN: HCPCS | Performed by: FAMILY MEDICINE

## 2023-03-29 PROCEDURE — G8484 FLU IMMUNIZE NO ADMIN: HCPCS | Performed by: FAMILY MEDICINE

## 2023-03-29 RX ORDER — FLUTICASONE PROPIONATE 50 MCG
2 SPRAY, SUSPENSION (ML) NASAL DAILY
Qty: 3 EACH | Refills: 3 | Status: SHIPPED | OUTPATIENT
Start: 2023-03-29

## 2023-03-29 RX ORDER — FEXOFENADINE HCL 180 MG/1
180 TABLET ORAL DAILY PRN
Qty: 90 TABLET | Refills: 3 | Status: SHIPPED | OUTPATIENT
Start: 2023-03-29

## 2023-03-29 RX ORDER — LOSARTAN POTASSIUM AND HYDROCHLOROTHIAZIDE 12.5; 1 MG/1; MG/1
1 TABLET ORAL DAILY
Qty: 90 TABLET | Refills: 1 | Status: SHIPPED | OUTPATIENT
Start: 2023-03-29

## 2023-03-29 RX ORDER — LOSARTAN POTASSIUM AND HYDROCHLOROTHIAZIDE 12.5; 1 MG/1; MG/1
1 TABLET ORAL DAILY
Qty: 30 TABLET | Refills: 0 | Status: SHIPPED | OUTPATIENT
Start: 2023-03-29 | End: 2023-03-29 | Stop reason: SDUPTHER

## 2023-03-29 ASSESSMENT — ENCOUNTER SYMPTOMS
BLOOD IN STOOL: 0
ABDOMINAL PAIN: 0
VOMITING: 0
NAUSEA: 0

## 2023-03-29 ASSESSMENT — PATIENT HEALTH QUESTIONNAIRE - PHQ9
SUM OF ALL RESPONSES TO PHQ9 QUESTIONS 1 & 2: 1
SUM OF ALL RESPONSES TO PHQ QUESTIONS 1-9: 1
SUM OF ALL RESPONSES TO PHQ QUESTIONS 1-9: 1
2. FEELING DOWN, DEPRESSED OR HOPELESS: 1
SUM OF ALL RESPONSES TO PHQ QUESTIONS 1-9: 1
1. LITTLE INTEREST OR PLEASURE IN DOING THINGS: 0
SUM OF ALL RESPONSES TO PHQ QUESTIONS 1-9: 1

## 2023-03-29 NOTE — PROGRESS NOTES
doxycycline, a cough syrup and prednisone. But since then she still having ongoing ear pressure. Review of Systems:  Review of Systems   Constitutional:  Negative for chills, fever and unexpected weight change. Cardiovascular:  Negative for chest pain and leg swelling. Gastrointestinal:  Negative for abdominal pain, blood in stool, nausea and vomiting.        History:  Past Medical History:   Diagnosis Date    Abnormal Pap smear of cervix     Anxiety     Depression     Hypertension     Lung nodule     BENIGN, stable with serial 2 yr f/u     Obesity        Past Surgical History:   Procedure Laterality Date    BUNIONECTOMY  2005     SECTION      x 3    GYN      Cervical cryosurgery for abnormal cells    OK UNLISTED PROCEDURE ABDOMEN PERITONEUM & OMENTUM      after wieght loss    TONSILLECTOMY AND ADENOIDECTOMY      TUBAL LIGATION         Current Outpatient Medications   Medication Sig Dispense Refill    fluticasone (FLONASE) 50 MCG/ACT nasal spray 2 sprays by Nasal route daily 3 each 3    fexofenadine (ALLEGRA) 180 MG tablet Take 1 tablet by mouth daily as needed (allergies) 90 tablet 3    losartan-hydroCHLOROthiazide (HYZAAR) 100-12.5 MG per tablet Take 1 tablet by mouth daily 90 tablet 1    montelukast (SINGULAIR) 10 MG tablet TAKE 1 TABLET BY MOUTH AT NIGHT  FOR ALLERGIES 90 tablet 0    FLUoxetine (PROZAC) 40 MG capsule TAKE 1 CAPSULE BY MOUTH  TWICE DAILY 180 capsule 0    traZODone (DESYREL) 100 MG tablet TAKE 1 TABLET BY MOUTH AT NIGHT 90 tablet 0    folic acid (FOLVITE) 1 MG tablet TAKE 1 TABLET BY MOUTH DAILY FOR INADEQUATE FOLIC ACID 90 tablet 0    pantoprazole (PROTONIX) 20 MG tablet TAKE 1 TABLET BY MOUTH  DAILY BEFORE BREAKFAST FOR  HEARTBURN 90 tablet 0    solifenacin (VESICARE) 5 MG tablet TAKE 1 TABLET BY MOUTH  DAILY FOR A CONDITION WHERE THE URGE TO URINATE RESULTS IN URINE LEAKAGE 90 tablet 0    nystatin (MYCOSTATIN) 192235 UNIT/GM powder Apply powder to skin folds 3 times a day as

## 2023-03-31 ENCOUNTER — HOSPITAL ENCOUNTER (OUTPATIENT)
Dept: CT IMAGING | Age: 53
Discharge: HOME | End: 2023-03-31
Payer: COMMERCIAL

## 2023-03-31 DIAGNOSIS — Z87.891 PERSONAL HISTORY OF TOBACCO USE: ICD-10-CM

## 2023-03-31 PROCEDURE — 71271 CT THORAX LUNG CANCER SCR C-: CPT

## 2023-04-05 ENCOUNTER — TELEPHONE (OUTPATIENT)
Dept: FAMILY MEDICINE CLINIC | Facility: CLINIC | Age: 53
End: 2023-04-05

## 2023-04-05 ENCOUNTER — NURSE ONLY (OUTPATIENT)
Dept: FAMILY MEDICINE CLINIC | Facility: CLINIC | Age: 53
End: 2023-04-05

## 2023-04-05 DIAGNOSIS — I10 ESSENTIAL HYPERTENSION: ICD-10-CM

## 2023-04-06 LAB
ANION GAP SERPL CALC-SCNC: 6 MMOL/L (ref 2–11)
BUN SERPL-MCNC: 19 MG/DL (ref 6–23)
CALCIUM SERPL-MCNC: 8.9 MG/DL (ref 8.3–10.4)
CHLORIDE SERPL-SCNC: 103 MMOL/L (ref 101–110)
CO2 SERPL-SCNC: 28 MMOL/L (ref 21–32)
CREAT SERPL-MCNC: 1 MG/DL (ref 0.6–1)
GLUCOSE SERPL-MCNC: 110 MG/DL (ref 65–100)
POTASSIUM SERPL-SCNC: 4.2 MMOL/L (ref 3.5–5.1)
SODIUM SERPL-SCNC: 137 MMOL/L (ref 133–143)

## 2023-05-17 ENCOUNTER — OFFICE VISIT (OUTPATIENT)
Dept: FAMILY MEDICINE CLINIC | Facility: CLINIC | Age: 53
End: 2023-05-17
Payer: COMMERCIAL

## 2023-05-17 VITALS
DIASTOLIC BLOOD PRESSURE: 94 MMHG | OXYGEN SATURATION: 98 % | BODY MASS INDEX: 41.9 KG/M2 | HEIGHT: 69 IN | WEIGHT: 282.9 LBS | RESPIRATION RATE: 16 BRPM | SYSTOLIC BLOOD PRESSURE: 132 MMHG | HEART RATE: 86 BPM

## 2023-05-17 DIAGNOSIS — L20.84 INTRINSIC ECZEMA: ICD-10-CM

## 2023-05-17 DIAGNOSIS — Z11.51 ENCOUNTER FOR SCREENING FOR HUMAN PAPILLOMAVIRUS (HPV): ICD-10-CM

## 2023-05-17 DIAGNOSIS — I10 ESSENTIAL HYPERTENSION: ICD-10-CM

## 2023-05-17 DIAGNOSIS — N95.0 POST-MENOPAUSE BLEEDING: Primary | ICD-10-CM

## 2023-05-17 DIAGNOSIS — Z12.4 CERVICAL CANCER SCREENING: ICD-10-CM

## 2023-05-17 PROCEDURE — G8417 CALC BMI ABV UP PARAM F/U: HCPCS | Performed by: FAMILY MEDICINE

## 2023-05-17 PROCEDURE — G8427 DOCREV CUR MEDS BY ELIG CLIN: HCPCS | Performed by: FAMILY MEDICINE

## 2023-05-17 PROCEDURE — 3017F COLORECTAL CA SCREEN DOC REV: CPT | Performed by: FAMILY MEDICINE

## 2023-05-17 PROCEDURE — 3075F SYST BP GE 130 - 139MM HG: CPT | Performed by: FAMILY MEDICINE

## 2023-05-17 PROCEDURE — 3080F DIAST BP >= 90 MM HG: CPT | Performed by: FAMILY MEDICINE

## 2023-05-17 PROCEDURE — 99214 OFFICE O/P EST MOD 30 MIN: CPT | Performed by: FAMILY MEDICINE

## 2023-05-17 PROCEDURE — 1036F TOBACCO NON-USER: CPT | Performed by: FAMILY MEDICINE

## 2023-05-17 RX ORDER — BETAMETHASONE DIPROPIONATE 0.05 %
OINTMENT (GRAM) TOPICAL
Qty: 45 G | Refills: 1 | Status: SHIPPED | OUTPATIENT
Start: 2023-05-17

## 2023-05-17 RX ORDER — LAMOTRIGINE 25 MG/1
TABLET ORAL
COMMUNITY
Start: 2023-04-26

## 2023-05-17 RX ORDER — AMLODIPINE BESYLATE 5 MG/1
5 TABLET ORAL DAILY
Qty: 90 TABLET | Refills: 0 | Status: SHIPPED | OUTPATIENT
Start: 2023-05-17

## 2023-05-17 ASSESSMENT — ENCOUNTER SYMPTOMS
BLOOD IN STOOL: 0
NAUSEA: 0
VOMITING: 0
COUGH: 0
SHORTNESS OF BREATH: 0
ABDOMINAL PAIN: 0

## 2023-05-17 ASSESSMENT — PATIENT HEALTH QUESTIONNAIRE - PHQ9
SUM OF ALL RESPONSES TO PHQ QUESTIONS 1-9: 7
2. FEELING DOWN, DEPRESSED OR HOPELESS: 2
SUM OF ALL RESPONSES TO PHQ QUESTIONS 1-9: 7
8. MOVING OR SPEAKING SO SLOWLY THAT OTHER PEOPLE COULD HAVE NOTICED. OR THE OPPOSITE, BEING SO FIGETY OR RESTLESS THAT YOU HAVE BEEN MOVING AROUND A LOT MORE THAN USUAL: 0
SUM OF ALL RESPONSES TO PHQ QUESTIONS 1-9: 7
SUM OF ALL RESPONSES TO PHQ9 QUESTIONS 1 & 2: 2
9. THOUGHTS THAT YOU WOULD BE BETTER OFF DEAD, OR OF HURTING YOURSELF: 0
1. LITTLE INTEREST OR PLEASURE IN DOING THINGS: 0
5. POOR APPETITE OR OVEREATING: 0
7. TROUBLE CONCENTRATING ON THINGS, SUCH AS READING THE NEWSPAPER OR WATCHING TELEVISION: 1
4. FEELING TIRED OR HAVING LITTLE ENERGY: 2
SUM OF ALL RESPONSES TO PHQ QUESTIONS 1-9: 7
6. FEELING BAD ABOUT YOURSELF - OR THAT YOU ARE A FAILURE OR HAVE LET YOURSELF OR YOUR FAMILY DOWN: 1
3. TROUBLE FALLING OR STAYING ASLEEP: 1
10. IF YOU CHECKED OFF ANY PROBLEMS, HOW DIFFICULT HAVE THESE PROBLEMS MADE IT FOR YOU TO DO YOUR WORK, TAKE CARE OF THINGS AT HOME, OR GET ALONG WITH OTHER PEOPLE: 0

## 2023-05-17 NOTE — PROGRESS NOTES
ointment Apply topically daily as needed for rash 45 g 1    amLODIPine (NORVASC) 5 MG tablet Take 1 tablet by mouth daily 90 tablet 0    solifenacin (VESICARE) 5 MG tablet TAKE 1 TABLET BY MOUTH DAILY FOR A CONDITION WHERE THE URGE TO  URINATE RESULTS IN URINE LEAKAGE 90 tablet 0    fluticasone (FLONASE) 50 MCG/ACT nasal spray 2 sprays by Nasal route daily 3 each 3    fexofenadine (ALLEGRA) 180 MG tablet Take 1 tablet by mouth daily as needed (allergies) 90 tablet 3    losartan-hydroCHLOROthiazide (HYZAAR) 100-12.5 MG per tablet Take 1 tablet by mouth daily 90 tablet 1    montelukast (SINGULAIR) 10 MG tablet TAKE 1 TABLET BY MOUTH AT NIGHT  FOR ALLERGIES 90 tablet 0    FLUoxetine (PROZAC) 40 MG capsule TAKE 1 CAPSULE BY MOUTH  TWICE DAILY 180 capsule 0    traZODone (DESYREL) 100 MG tablet TAKE 1 TABLET BY MOUTH AT NIGHT 90 tablet 0    folic acid (FOLVITE) 1 MG tablet TAKE 1 TABLET BY MOUTH DAILY FOR INADEQUATE FOLIC ACID 90 tablet 0    pantoprazole (PROTONIX) 20 MG tablet TAKE 1 TABLET BY MOUTH  DAILY BEFORE BREAKFAST FOR  HEARTBURN 90 tablet 0    nystatin (MYCOSTATIN) 494047 UNIT/GM powder Apply powder to skin folds 3 times a day as needed for rash 60 g 5    betamethasone valerate (VALISONE) 0.1 % lotion Use topically twice daily as needed to irritated or itchy skin/rash. Do not use for more than 7 days in a row 60 mL 1    albuterol sulfate  (90 Base) MCG/ACT inhaler Inhale 2 puffs into the lungs every 4 hours as needed      albuterol (PROVENTIL) (2.5 MG/3ML) 0.083% nebulizer solution Inhale 3 mLs into the lungs every 4 hours as needed      ascorbic acid (VITAMIN C) 500 MG tablet Take 1 tablet by mouth daily       No current facility-administered medications for this visit.        Immunization History   Administered Date(s) Administered    Influenza Virus Vaccine 10/22/2018, 02/05/2020    Influenza, FLUARIX, FLULAVAL, FLUZONE (age 10 mo+) AND AFLURIA, (age 1 y+), PF, 0.5mL 02/05/2020    Influenza, FLUCELVAX,

## 2023-05-23 RX ORDER — PANTOPRAZOLE SODIUM 20 MG/1
TABLET, DELAYED RELEASE ORAL
Qty: 90 TABLET | Refills: 0 | Status: SHIPPED | OUTPATIENT
Start: 2023-05-23

## 2023-05-26 LAB
CYTOLOGIST CVX/VAG CYTO: NORMAL
CYTOLOGY CVX/VAG DOC THIN PREP: NORMAL
HPV APTIMA: NEGATIVE
HPV GENOTYPE REFLEX: NORMAL
Lab: NORMAL
Lab: NORMAL
PATH REPORT.FINAL DX SPEC: NORMAL
STAT OF ADQ CVX/VAG CYTO-IMP: NORMAL

## 2023-05-30 RX ORDER — TRAZODONE HYDROCHLORIDE 100 MG/1
TABLET ORAL
Qty: 90 TABLET | Refills: 3 | OUTPATIENT
Start: 2023-05-30

## 2023-05-30 RX ORDER — FLUOXETINE HYDROCHLORIDE 40 MG/1
CAPSULE ORAL
Qty: 180 CAPSULE | Refills: 3 | OUTPATIENT
Start: 2023-05-30

## 2023-05-30 RX ORDER — MONTELUKAST SODIUM 10 MG/1
TABLET ORAL
Qty: 90 TABLET | Refills: 0 | Status: SHIPPED | OUTPATIENT
Start: 2023-05-30

## 2023-05-30 RX ORDER — FOLIC ACID 1 MG/1
TABLET ORAL
Qty: 90 TABLET | Refills: 0 | Status: SHIPPED | OUTPATIENT
Start: 2023-05-30 | End: 2023-07-20 | Stop reason: ALTCHOICE

## 2023-06-05 ENCOUNTER — HOSPITAL ENCOUNTER (OUTPATIENT)
Dept: MAMMOGRAPHY | Age: 53
Discharge: HOME | End: 2023-06-08
Payer: COMMERCIAL

## 2023-06-05 DIAGNOSIS — Z12.31 ENCOUNTER FOR SCREENING MAMMOGRAM FOR BREAST CANCER: ICD-10-CM

## 2023-06-05 PROCEDURE — 77067 SCR MAMMO BI INCL CAD: CPT

## 2023-06-13 ENCOUNTER — PREP FOR PROCEDURE (OUTPATIENT)
Dept: SURGERY | Age: 53
End: 2023-06-13

## 2023-06-13 DIAGNOSIS — E66.01 MORBID OBESITY (HCC): ICD-10-CM

## 2023-06-13 DIAGNOSIS — Z01.812 ENCOUNTER FOR PRE-OPERATIVE LABORATORY TESTING: ICD-10-CM

## 2023-06-13 DIAGNOSIS — Z13.21 ENCOUNTER FOR VITAMIN DEFICIENCY SCREENING: ICD-10-CM

## 2023-06-13 DIAGNOSIS — Z71.89 ENCOUNTER FOR PRE-BARIATRIC SURGERY COUNSELING AND EDUCATION: ICD-10-CM

## 2023-06-13 PROBLEM — K59.04 CHRONIC IDIOPATHIC CONSTIPATION: Status: RESOLVED | Noted: 2020-05-06 | Resolved: 2023-06-13

## 2023-06-13 PROBLEM — Z87.19 HISTORY OF GASTROESOPHAGEAL REFLUX (GERD): Status: ACTIVE | Noted: 2023-06-13

## 2023-06-14 LAB
25(OH)D3 SERPL-MCNC: 26.3 NG/ML (ref 30–100)
EST. AVERAGE GLUCOSE BLD GHB EST-MCNC: 114 MG/DL
FERRITIN SERPL-MCNC: 30 NG/ML (ref 8–388)
FOLATE SERPL-MCNC: 37.3 NG/ML (ref 3.1–17.5)
HBA1C MFR BLD: 5.6 % (ref 4.8–5.6)
IRON SERPL-MCNC: 43 UG/DL (ref 35–150)
TSH, 3RD GENERATION: 3.05 UIU/ML (ref 0.36–3.74)
VIT B12 SERPL-MCNC: 345 PG/ML (ref 193–986)

## 2023-06-16 LAB
COTININE SERPL-MCNC: <1 NG/ML
NICOTINE SERPL-MCNC: <1 NG/ML

## 2023-06-22 ENCOUNTER — ANESTHESIA EVENT (OUTPATIENT)
Dept: ENDOSCOPY | Age: 53
End: 2023-06-22
Payer: COMMERCIAL

## 2023-06-23 ENCOUNTER — ANESTHESIA (OUTPATIENT)
Dept: ENDOSCOPY | Age: 53
End: 2023-06-23
Payer: COMMERCIAL

## 2023-06-23 ENCOUNTER — HOSPITAL ENCOUNTER (OUTPATIENT)
Age: 53
Setting detail: OUTPATIENT SURGERY
Discharge: HOME | End: 2023-06-23
Attending: SURGERY | Admitting: SURGERY
Payer: COMMERCIAL

## 2023-06-23 VITALS
HEIGHT: 69 IN | WEIGHT: 287 LBS | HEART RATE: 57 BPM | RESPIRATION RATE: 16 BRPM | OXYGEN SATURATION: 94 % | BODY MASS INDEX: 42.51 KG/M2 | SYSTOLIC BLOOD PRESSURE: 132 MMHG | DIASTOLIC BLOOD PRESSURE: 63 MMHG | TEMPERATURE: 98.1 F

## 2023-06-23 DIAGNOSIS — Z87.19 HISTORY OF GASTROESOPHAGEAL REFLUX (GERD): ICD-10-CM

## 2023-06-23 LAB — HCG UR QL: NEGATIVE

## 2023-06-23 PROCEDURE — 43239 EGD BIOPSY SINGLE/MULTIPLE: CPT | Performed by: SURGERY

## 2023-06-23 PROCEDURE — 88305 TISSUE EXAM BY PATHOLOGIST: CPT

## 2023-06-23 PROCEDURE — 2580000003 HC RX 258: Performed by: STUDENT IN AN ORGANIZED HEALTH CARE EDUCATION/TRAINING PROGRAM

## 2023-06-23 PROCEDURE — 88312 SPECIAL STAINS GROUP 1: CPT

## 2023-06-23 PROCEDURE — 2500000003 HC RX 250 WO HCPCS: Performed by: REGISTERED NURSE

## 2023-06-23 PROCEDURE — 7100000011 HC PHASE II RECOVERY - ADDTL 15 MIN: Performed by: SURGERY

## 2023-06-23 PROCEDURE — 81025 URINE PREGNANCY TEST: CPT

## 2023-06-23 PROCEDURE — 6360000002 HC RX W HCPCS: Performed by: REGISTERED NURSE

## 2023-06-23 PROCEDURE — 3700000000 HC ANESTHESIA ATTENDED CARE: Performed by: SURGERY

## 2023-06-23 PROCEDURE — 2709999900 HC NON-CHARGEABLE SUPPLY: Performed by: SURGERY

## 2023-06-23 PROCEDURE — 3609012400 HC EGD TRANSORAL BIOPSY SINGLE/MULTIPLE: Performed by: SURGERY

## 2023-06-23 PROCEDURE — 7100000010 HC PHASE II RECOVERY - FIRST 15 MIN: Performed by: SURGERY

## 2023-06-23 RX ORDER — SODIUM CHLORIDE 0.9 % (FLUSH) 0.9 %
5-40 SYRINGE (ML) INJECTION PRN
Status: DISCONTINUED | OUTPATIENT
Start: 2023-06-23 | End: 2023-06-23 | Stop reason: HOSPADM

## 2023-06-23 RX ORDER — GLYCOPYRROLATE 0.2 MG/ML
INJECTION INTRAMUSCULAR; INTRAVENOUS PRN
Status: DISCONTINUED | OUTPATIENT
Start: 2023-06-23 | End: 2023-06-23 | Stop reason: SDUPTHER

## 2023-06-23 RX ORDER — SODIUM CHLORIDE, SODIUM LACTATE, POTASSIUM CHLORIDE, CALCIUM CHLORIDE 600; 310; 30; 20 MG/100ML; MG/100ML; MG/100ML; MG/100ML
INJECTION, SOLUTION INTRAVENOUS CONTINUOUS
Status: DISCONTINUED | OUTPATIENT
Start: 2023-06-23 | End: 2023-06-23 | Stop reason: HOSPADM

## 2023-06-23 RX ORDER — SODIUM CHLORIDE 9 MG/ML
INJECTION, SOLUTION INTRAVENOUS PRN
Status: DISCONTINUED | OUTPATIENT
Start: 2023-06-23 | End: 2023-06-23 | Stop reason: HOSPADM

## 2023-06-23 RX ORDER — SODIUM CHLORIDE 0.9 % (FLUSH) 0.9 %
5-40 SYRINGE (ML) INJECTION EVERY 12 HOURS SCHEDULED
Status: DISCONTINUED | OUTPATIENT
Start: 2023-06-23 | End: 2023-06-23 | Stop reason: HOSPADM

## 2023-06-23 RX ORDER — LIDOCAINE HYDROCHLORIDE 10 MG/ML
1 INJECTION, SOLUTION INFILTRATION; PERINEURAL
Status: DISCONTINUED | OUTPATIENT
Start: 2023-06-23 | End: 2023-06-23 | Stop reason: HOSPADM

## 2023-06-23 RX ORDER — LIDOCAINE HYDROCHLORIDE 20 MG/ML
INJECTION, SOLUTION EPIDURAL; INFILTRATION; INTRACAUDAL; PERINEURAL PRN
Status: DISCONTINUED | OUTPATIENT
Start: 2023-06-23 | End: 2023-06-23 | Stop reason: SDUPTHER

## 2023-06-23 RX ORDER — PROPOFOL 10 MG/ML
INJECTION, EMULSION INTRAVENOUS PRN
Status: DISCONTINUED | OUTPATIENT
Start: 2023-06-23 | End: 2023-06-23 | Stop reason: SDUPTHER

## 2023-06-23 RX ADMIN — SODIUM CHLORIDE, POTASSIUM CHLORIDE, SODIUM LACTATE AND CALCIUM CHLORIDE: 600; 310; 30; 20 INJECTION, SOLUTION INTRAVENOUS at 07:34

## 2023-06-23 RX ADMIN — PROPOFOL 100 MG: 10 INJECTION, EMULSION INTRAVENOUS at 08:48

## 2023-06-23 RX ADMIN — GLYCOPYRROLATE 0.1 MG: 0.2 INJECTION INTRAMUSCULAR; INTRAVENOUS at 08:45

## 2023-06-23 RX ADMIN — LIDOCAINE HYDROCHLORIDE 100 MG: 20 INJECTION, SOLUTION EPIDURAL; INFILTRATION; INTRACAUDAL; PERINEURAL at 08:48

## 2023-06-23 RX ADMIN — PROPOFOL 20 MG: 10 INJECTION, EMULSION INTRAVENOUS at 08:51

## 2023-06-23 ASSESSMENT — LIFESTYLE VARIABLES: SMOKING_STATUS: 0

## 2023-06-23 ASSESSMENT — PAIN - FUNCTIONAL ASSESSMENT: PAIN_FUNCTIONAL_ASSESSMENT: NONE - DENIES PAIN

## 2023-06-23 NOTE — OP NOTE
Esophagogastroduodenoscopy Procedure Note    Date of procedure: 2023      Name: Aurea Melendez      MRN: [id number]       : 1970       Age: 47 y.o. Sex: female    Preoperative Diagnosis: 1. GERD      2. Preop eval    Postoperative Diagnosis: 1. same      2. Antral erosions      3. Small sliding hiatal hernia      4. [place] colored mucosa in esophagus    Procedure(s):  EGD BIOPSY 61647    Procedure in Detail:  Informed consent was obtained for the procedure, the patient was brought to the endoscopy suite and sedation was induced by anesthesia. The [id number] gastroscope was inserted into the mouth and advanced under direct vision to second portion of the duodenum. A careful inspection was made as the gastroscope was withdrawn, including a retroflexed view of the proximal stomach; findings and interventions are described below, with appropriate photodocumentation obtained. Findings:   Oropharynx:   Normal  Esophagus:       - [place] colored mucosa biopsied. Extending about 1 cm up the GEJ. GEJ 38 cm  Cardia of the stomach:    Normal  Body of the stomach:    Normal  Fundus of the stomach:    Normal  Antrum of the stomach:      - Excavated lesions:     -Erosions    - Flat lesions:     - mild gastritis  Duodenum:    Normal        Therapies:    biopsy of stomach body, pre pyloric antrum    EBL-  minimal    Specimens: Specimens were collected and sent to pathology. ID Type Source Tests Collected by Time Destination   A : Gastric bxs Tissue Gastric SURGICAL PATHOLOGY Yoly Justice MD 2023 [id number]    B : Esophageal bxs Tissue Esophagus SURGICAL PATHOLOGY Yoly Justice MD 2023 [id number]                Complications:   None; patient tolerated the procedure well. Recommendations:  - Await pathology.     Signed by: Yoly Justice MD  [address] [facility]  2023 8:56 AM

## 2023-06-23 NOTE — DISCHARGE INSTRUCTIONS
Gastrointestinal Esophagogastroduodenoscopy (EGD)-Upper Exam Discharge Instructions    1. Call Dr. Tamika Vyas  for any problems or questions. 2. Contact the doctor's office for follow up appointment as directed. 3. Medication may cause drowsiness for several hours, therefore, do not drive or operate machinery for remainder of the day. 4. No alcohol today. 5. Do not make any important decisions such as signing legal paperwork. 6. Ordinarily, you may resume regular diet and activity after exam unless otherwise specified by your physician. 7. For mild soreness in your throat you may use Cepacol throat lozenges or warm  salt-water gargles as needed. Any additional instructions: They will call you  with the results of the biopsies. Instructions given to Destiny Dowd and other family members.   \

## 2023-06-23 NOTE — INTERVAL H&P NOTE
Update History & Physical    The patient's History and Physical of June 13, EGD was reviewed with the patient and I examined the patient. There was no change. The surgical site was confirmed by the patient and me. Plan: The risks, benefits, expected outcome, and alternative to the recommended procedure have been discussed with the patient. Patient understands and wants to proceed with the procedure.      Electronically signed by Petros Park MD on 6/23/2023 at 7:47 AM

## 2023-06-23 NOTE — ANESTHESIA POSTPROCEDURE EVALUATION
Department of Anesthesiology  Postprocedure Note    Patient: Trinity Elizabeth  MRN: [id number]  YOB: 1970  Date of evaluation: 6/23/2023      Procedure Summary     Date: 06/23/23 [place] [facility] [place] [facility] [place]    Anesthesia Start: 0841 Anesthesia Stop: 0901    Procedure: EGD BIOPSY (Upper GI Region) Diagnosis:       History of gastroesophageal reflux (GERD)      (History of gastroesophageal reflux (GERD) [Z87.19])    Surgeons: Aleene Collet, MD Responsible Provider: Shellie Silverman MD    Anesthesia Type: TIVA ASA Status: 3          Anesthesia Type: No value filed.     Ilan Phase I: Ilan Score: 10    Ilan Phase II: Ilan Score: 10      Anesthesia Post Evaluation    Patient location during evaluation: PACU  Patient participation: complete - patient participated  Level of consciousness: awake  Pain score: 0  Airway patency: patent  Nausea & Vomiting: no nausea and no vomiting  Complications: no  Cardiovascular status: blood pressure returned to baseline  Respiratory status: acceptable  Hydration status: euvolemic

## 2023-06-23 NOTE — PROGRESS NOTES
Pt discharged home with Mina- , mina driving, VSS, instructions reviewed with mina and patient , understanding verbalized. All belongings sent home with Pt. Pt transported out via wheelchair by [facility] staff.

## 2023-06-23 NOTE — ANESTHESIA PRE PROCEDURE
Department of Anesthesiology  Preprocedure Note       Name:  Cordella Opitz   Age:  47 y.o.  :  1970                                          MRN:  [id number]         Date:  2023      Surgeon: Annette Vieira):  Gretta Silverman MD    Procedure: Procedure(s):  EGD ESOPHAGOGASTRODUODENOSCOPY/bmi 42    Medications prior to admission:   Prior to Admission medications    Medication Sig Start Date End Date Taking?  Authorizing Provider   montelukast (SINGULAIR) 10 MG tablet TAKE 1 TABLET BY MOUTH AT NIGHT  FOR ALLERGIES 23   Dieter Del Real DO   folic acid (FOLVITE) 1 MG tablet TAKE 1 TABLET BY MOUTH DAILY FOR INADEQUATE FOLIC ACID 64   Dieter Del Real,    pantoprazole (PROTONIX) 20 MG tablet TAKE 1 TABLET BY MOUTH DAILY  BEFORE BREAKFAST FOR HEARTBURN  Patient taking differently: Take 1 tablet by mouth at bedtime TAKE 1 TABLET BY MOUTH DAILY BEFORE BREAKFAST FOR HEARTBURN 23   Dieetr Del Real DO   lamoTRIgine (LAMICTAL) 25 MG tablet Take 1 tablet by mouth at bedtime 23   Historical Provider, MD   betamethasone dipropionate 0.05 % ointment Apply topically daily as needed for rash 23   Dieter Del Real DO   amLODIPine (NORVASC) 5 MG tablet Take 1 tablet by mouth daily  Patient not taking: Reported on 2023   Dieter Del Real DO   solifenacin (VESICARE) 5 MG tablet TAKE 1 TABLET BY MOUTH DAILY FOR A CONDITION WHERE THE URGE TO  URINATE RESULTS IN URINE LEAKAGE  Patient taking differently: Take 1 tablet by mouth at bedtime 23   Dieter Del Real DO   fluticasone [facility]) 50 MCG/ACT nasal spray 2 sprays by Nasal route daily  Patient taking differently: 2 sprays by Nasal route daily as needed 3/29/23   Dieter Del Real DO   fexofenadine (ALLEGRA) 180 MG tablet Take 1 tablet by mouth daily as needed (allergies)  Patient taking differently: Take 1 tablet by mouth nightly 3/29/23   Dieter Del Real DO   losartan-hydroCHLOROthiazide [facility]) 100-12.5

## 2023-06-26 ENCOUNTER — HOSPITAL ENCOUNTER (OUTPATIENT)
Dept: PHYSICAL THERAPY | Age: 53
Setting detail: RECURRING SERIES
Discharge: HOME | End: 2023-06-29
Attending: SURGERY
Payer: COMMERCIAL

## 2023-06-26 PROCEDURE — 97161 PT EVAL LOW COMPLEX 20 MIN: CPT

## 2023-06-29 ENCOUNTER — TELEPHONE (OUTPATIENT)
Dept: SURGERY | Age: 53
End: 2023-06-29

## 2023-07-09 DIAGNOSIS — I10 ESSENTIAL HYPERTENSION: ICD-10-CM

## 2023-07-10 RX ORDER — AMLODIPINE BESYLATE 5 MG/1
5 TABLET ORAL DAILY
Qty: 90 TABLET | Refills: 3 | OUTPATIENT
Start: 2023-07-10

## 2023-07-11 NOTE — TELEPHONE ENCOUNTER
Called pt, left detailed message to call and schedule follow up with Dr. Martha Casas for blood pressure. Pt cancelled her appointment for 6/28/23 for blood pressure recheck.

## 2023-07-13 RX ORDER — SOLIFENACIN SUCCINATE 5 MG/1
TABLET, FILM COATED ORAL
Qty: 90 TABLET | Refills: 0 | Status: SHIPPED | OUTPATIENT
Start: 2023-07-13

## 2023-07-17 ENCOUNTER — OFFICE VISIT (OUTPATIENT)
Dept: SURGERY | Age: 53
End: 2023-07-17
Payer: COMMERCIAL

## 2023-07-17 VITALS
HEART RATE: 88 BPM | WEIGHT: 284 LBS | SYSTOLIC BLOOD PRESSURE: 134 MMHG | BODY MASS INDEX: 42.06 KG/M2 | DIASTOLIC BLOOD PRESSURE: 76 MMHG | HEIGHT: 69 IN

## 2023-07-17 DIAGNOSIS — K21.9 GASTROESOPHAGEAL REFLUX DISEASE, UNSPECIFIED WHETHER ESOPHAGITIS PRESENT: ICD-10-CM

## 2023-07-17 DIAGNOSIS — K59.04 CHRONIC IDIOPATHIC CONSTIPATION: ICD-10-CM

## 2023-07-17 DIAGNOSIS — E66.01 OBESITY, CLASS III, BMI 40-49.9 (MORBID OBESITY) (HCC): ICD-10-CM

## 2023-07-17 DIAGNOSIS — Z71.3 DIETARY COUNSELING: ICD-10-CM

## 2023-07-17 DIAGNOSIS — Z71.82 EXERCISE COUNSELING: ICD-10-CM

## 2023-07-17 DIAGNOSIS — I10 PRIMARY HYPERTENSION: ICD-10-CM

## 2023-07-17 DIAGNOSIS — E66.01 MORBID OBESITY (HCC): Primary | ICD-10-CM

## 2023-07-17 DIAGNOSIS — E78.2 MIXED HYPERLIPIDEMIA: ICD-10-CM

## 2023-07-17 PROCEDURE — 3017F COLORECTAL CA SCREEN DOC REV: CPT | Performed by: PHYSICIAN ASSISTANT

## 2023-07-17 PROCEDURE — 99215 OFFICE O/P EST HI 40 MIN: CPT | Performed by: PHYSICIAN ASSISTANT

## 2023-07-17 PROCEDURE — 3078F DIAST BP <80 MM HG: CPT | Performed by: PHYSICIAN ASSISTANT

## 2023-07-17 PROCEDURE — G8417 CALC BMI ABV UP PARAM F/U: HCPCS | Performed by: PHYSICIAN ASSISTANT

## 2023-07-17 PROCEDURE — 1036F TOBACCO NON-USER: CPT | Performed by: PHYSICIAN ASSISTANT

## 2023-07-17 PROCEDURE — 3074F SYST BP LT 130 MM HG: CPT | Performed by: PHYSICIAN ASSISTANT

## 2023-07-17 PROCEDURE — G8427 DOCREV CUR MEDS BY ELIG CLIN: HCPCS | Performed by: PHYSICIAN ASSISTANT

## 2023-07-17 NOTE — PROGRESS NOTES
hydrating fluid intake. Educated on protein counting and encouraged practice. Educated on protein and vitamin/mineral needs for after sx and encouraged trialing for preference/tolerance. *Pt verbalizes understanding of information provided during this session. Nutrition Monitoring and Evaluation:   Monitor for understanding of diet and exercise rx. Follow up for practicing mindful eating behaviors and meal priority. Follow for increased activity. F/U one month    Impression:      Readiness to learn and change: Fair   Comprehension level: Moderate   Anticipated compliance: Moderate     Fair SWL candidate at this time. Pt has made good changes during supervised diet with RD. RD feels as though pt will be able to adhere to post-operative instructions and plan of care. Pt understands the habits that must be in place to support the SWL tool. I look forward to continuing to work with pt.         Sabino Benoit, MS, RD, LD

## 2023-07-18 PROBLEM — I10 HYPERTENSION: Status: ACTIVE | Noted: 2023-07-18

## 2023-07-18 PROBLEM — K21.9 GERD (GASTROESOPHAGEAL REFLUX DISEASE): Status: ACTIVE | Noted: 2023-07-18

## 2023-07-18 RX ORDER — DEXTROAMPHETAMINE/AMPHETAMINE 10 MG
10 CAPSULE, EXT RELEASE 24 HR ORAL EVERY MORNING
COMMUNITY
Start: 2023-07-05

## 2023-07-18 RX ORDER — LAMOTRIGINE 100 MG/1
TABLET ORAL
COMMUNITY
Start: 2023-06-01

## 2023-07-20 ENCOUNTER — OFFICE VISIT (OUTPATIENT)
Dept: FAMILY MEDICINE CLINIC | Facility: CLINIC | Age: 53
End: 2023-07-20
Payer: COMMERCIAL

## 2023-07-20 VITALS
HEIGHT: 69 IN | OXYGEN SATURATION: 95 % | BODY MASS INDEX: 41.8 KG/M2 | DIASTOLIC BLOOD PRESSURE: 72 MMHG | SYSTOLIC BLOOD PRESSURE: 126 MMHG | HEART RATE: 66 BPM | WEIGHT: 282.2 LBS | RESPIRATION RATE: 16 BRPM

## 2023-07-20 DIAGNOSIS — N95.0 POST-MENOPAUSE BLEEDING: ICD-10-CM

## 2023-07-20 DIAGNOSIS — N39.41 URGE INCONTINENCE: ICD-10-CM

## 2023-07-20 DIAGNOSIS — I10 ESSENTIAL HYPERTENSION: Primary | ICD-10-CM

## 2023-07-20 DIAGNOSIS — J30.2 SEASONAL ALLERGIES: ICD-10-CM

## 2023-07-20 DIAGNOSIS — R07.89 CHEST WALL PAIN: ICD-10-CM

## 2023-07-20 PROCEDURE — G8427 DOCREV CUR MEDS BY ELIG CLIN: HCPCS | Performed by: FAMILY MEDICINE

## 2023-07-20 PROCEDURE — G8417 CALC BMI ABV UP PARAM F/U: HCPCS | Performed by: FAMILY MEDICINE

## 2023-07-20 PROCEDURE — 3017F COLORECTAL CA SCREEN DOC REV: CPT | Performed by: FAMILY MEDICINE

## 2023-07-20 PROCEDURE — 1036F TOBACCO NON-USER: CPT | Performed by: FAMILY MEDICINE

## 2023-07-20 PROCEDURE — 99214 OFFICE O/P EST MOD 30 MIN: CPT | Performed by: FAMILY MEDICINE

## 2023-07-20 PROCEDURE — 3074F SYST BP LT 130 MM HG: CPT | Performed by: FAMILY MEDICINE

## 2023-07-20 PROCEDURE — 3078F DIAST BP <80 MM HG: CPT | Performed by: FAMILY MEDICINE

## 2023-07-20 RX ORDER — TRAZODONE HYDROCHLORIDE 100 MG/1
100 TABLET ORAL NIGHTLY
COMMUNITY

## 2023-07-20 RX ORDER — FLUOXETINE HYDROCHLORIDE 40 MG/1
80 CAPSULE ORAL NIGHTLY
COMMUNITY

## 2023-07-20 RX ORDER — LOSARTAN POTASSIUM AND HYDROCHLOROTHIAZIDE 12.5; 1 MG/1; MG/1
1 TABLET ORAL NIGHTLY
Qty: 90 TABLET | Refills: 1 | Status: SHIPPED | OUTPATIENT
Start: 2023-07-20

## 2023-07-20 RX ORDER — AMLODIPINE BESYLATE 5 MG/1
5 TABLET ORAL DAILY
Qty: 90 TABLET | Refills: 1 | Status: SHIPPED | OUTPATIENT
Start: 2023-07-20

## 2023-07-20 ASSESSMENT — ENCOUNTER SYMPTOMS
SHORTNESS OF BREATH: 0
COUGH: 0
ABDOMINAL PAIN: 0
BLOOD IN STOOL: 0
NAUSEA: 0
VOMITING: 0

## 2023-07-20 NOTE — PROGRESS NOTES
6150 Lalitha Glover, DO  [address], [address] [facility],[address]   No:  [phone]  Fax:  [phone]        Assessment/Plan:   Anne Jeffers was seen today for hypertension and other. Diagnoses and all orders for this visit:    Essential hypertension  Improved. Controlled. Continue amlodipine, losartan-HCTZ. -     amLODIPine (NORVASC) 5 MG tablet; Take 1 tablet by mouth daily  -     losartan-hydroCHLOROthiazide (HYZAAR) 100-12.5 MG per tablet; Take 1 tablet by mouth at bedtime For BP    Post-menopause bleeding  Provided her with a number to central scheduling so she can get the pelvic ultrasound completed. She reports no further bleeding since last appointment    Seasonal allergies  Continue current regimen of montelukast, Allegra, Flonase as needed    Urge incontinence  Stable. Continue Vesicare. Chest wall pain  Supportive care, heat and tincture of time        Sobeida Salas is a 47 y.o. female who is seen for evaluation of   Chief Complaint   Patient presents with    Hypertension    Other     Pt state had recent labs with Bariatric Clinic, Folate was elevated        HPI:   She had recent labs with the bariatric clinic. Folic acid level was high. Advised patient she can stop the folic acid supplement. Blood pressure controlled here and at other clinics. She is doing well with the amlodipine. She is not having any significant shortness of breath. She has a little bit of chest wall soreness but she has been moving a lot of heavy objects. She is looking forward to bariatric surgery she is doing what she can in regards to diet, increasing exercise. Jaime Mckeon  is working well for her bladder symptoms  Review of Systems:  Review of Systems   Constitutional:  Negative for chills, fever and unexpected weight change. Respiratory:  Negative for cough and shortness of breath. Cardiovascular:  Negative for leg swelling.    Gastrointestinal:  Negative for abdominal

## 2023-07-26 DIAGNOSIS — Z01.818 PRE-OP EVALUATION: Primary | ICD-10-CM

## 2023-08-01 ENCOUNTER — OFFICE VISIT (OUTPATIENT)
Dept: BEHAVIORAL/MENTAL HEALTH CLINIC | Facility: CLINIC | Age: 53
End: 2023-08-01

## 2023-08-01 DIAGNOSIS — Z87.828 HISTORY OF TRAUMA: ICD-10-CM

## 2023-08-01 DIAGNOSIS — F41.9 ANXIETY DISORDER, UNSPECIFIED TYPE: Primary | ICD-10-CM

## 2023-08-01 DIAGNOSIS — E66.01 OBESITY, MORBID, BMI 40.0-49.9 (HCC): ICD-10-CM

## 2023-08-02 ASSESSMENT — ANXIETY QUESTIONNAIRES
1. FEELING NERVOUS, ANXIOUS, OR ON EDGE: 0
3. WORRYING TOO MUCH ABOUT DIFFERENT THINGS: 0
5. BEING SO RESTLESS THAT IT IS HARD TO SIT STILL: 0
7. FEELING AFRAID AS IF SOMETHING AWFUL MIGHT HAPPEN: 0
6. BECOMING EASILY ANNOYED OR IRRITABLE: 1
GAD7 TOTAL SCORE: 1
2. NOT BEING ABLE TO STOP OR CONTROL WORRYING: 0
IF YOU CHECKED OFF ANY PROBLEMS ON THIS QUESTIONNAIRE, HOW DIFFICULT HAVE THESE PROBLEMS MADE IT FOR YOU TO DO YOUR WORK, TAKE CARE OF THINGS AT HOME, OR GET ALONG WITH OTHER PEOPLE: SOMEWHAT DIFFICULT
4. TROUBLE RELAXING: 0

## 2023-08-02 ASSESSMENT — PATIENT HEALTH QUESTIONNAIRE - PHQ9
9. THOUGHTS THAT YOU WOULD BE BETTER OFF DEAD, OR OF HURTING YOURSELF: 0
1. LITTLE INTEREST OR PLEASURE IN DOING THINGS: 0
4. FEELING TIRED OR HAVING LITTLE ENERGY: 1
SUM OF ALL RESPONSES TO PHQ QUESTIONS 1-9: 3
2. FEELING DOWN, DEPRESSED OR HOPELESS: 0
SUM OF ALL RESPONSES TO PHQ QUESTIONS 1-9: 3
SUM OF ALL RESPONSES TO PHQ QUESTIONS 1-9: 3
8. MOVING OR SPEAKING SO SLOWLY THAT OTHER PEOPLE COULD HAVE NOTICED. OR THE OPPOSITE, BEING SO FIGETY OR RESTLESS THAT YOU HAVE BEEN MOVING AROUND A LOT MORE THAN USUAL: 0
7. TROUBLE CONCENTRATING ON THINGS, SUCH AS READING THE NEWSPAPER OR WATCHING TELEVISION: 0
10. IF YOU CHECKED OFF ANY PROBLEMS, HOW DIFFICULT HAVE THESE PROBLEMS MADE IT FOR YOU TO DO YOUR WORK, TAKE CARE OF THINGS AT HOME, OR GET ALONG WITH OTHER PEOPLE: 1
5. POOR APPETITE OR OVEREATING: 0
SUM OF ALL RESPONSES TO PHQ QUESTIONS 1-9: 3
6. FEELING BAD ABOUT YOURSELF - OR THAT YOU ARE A FAILURE OR HAVE LET YOURSELF OR YOUR FAMILY DOWN: 0
3. TROUBLE FALLING OR STAYING ASLEEP: 2
SUM OF ALL RESPONSES TO PHQ9 QUESTIONS 1 & 2: 0

## 2023-08-02 ASSESSMENT — LIFESTYLE VARIABLES
HOW OFTEN DO YOU HAVE A DRINK CONTAINING ALCOHOL: NEVER
HOW MANY STANDARD DRINKS CONTAINING ALCOHOL DO YOU HAVE ON A TYPICAL DAY: PATIENT DOES NOT DRINK

## 2023-08-02 NOTE — PROGRESS NOTES
others notice - - -   Thoughts that you would be better off dead, or of hurting yourself in some way 0 0 -   Thoughts of being better off dead, or hurting yourself in some way - - -   PHQ-2 Score 0 2 1   Total Score PHQ 2 - - -   PHQ-9 Total Score 3 7 1   PHQ 9 Score - - -   If you checked off any problems, how difficult have these problems made it for you to do your work, take care of things at home, or get along with other people? 1 0 -   How difficult have these problems made it for you to do your work, take care of your home and get along with others - - -      STACI-7 SCREENING 8/2/2023 8/5/2022   Feeling nervous, anxious, or on edge Not at all Not at all   Not being able to stop or control worrying Not at all Several days   Worrying too much about different things Not at all Several days   Trouble relaxing Not at all Not at all   Being so restless that it is hard to sit still Not at all Several days   Becoming easily annoyed or irritable Several days Not at all   Feeling afraid as if something awful might happen Not at all Several days   STACI-7 Total Score 1 4   How difficult have these problems made it for you to do your work, take care of things at home, or get along with other people? Somewhat difficult Somewhat difficult        Yris Alberto.  SAVAGE Henao        Date:  8/2/2023

## 2023-08-03 ENCOUNTER — OFFICE VISIT (OUTPATIENT)
Dept: PULMONOLOGY | Age: 53
End: 2023-08-03

## 2023-08-03 ENCOUNTER — CLINICAL DOCUMENTATION (OUTPATIENT)
Dept: SURGERY | Age: 53
End: 2023-08-03

## 2023-08-03 VITALS
BODY MASS INDEX: 42.59 KG/M2 | OXYGEN SATURATION: 96 % | SYSTOLIC BLOOD PRESSURE: 124 MMHG | TEMPERATURE: 96.6 F | HEART RATE: 77 BPM | RESPIRATION RATE: 15 BRPM | HEIGHT: 68 IN | DIASTOLIC BLOOD PRESSURE: 78 MMHG | WEIGHT: 281 LBS

## 2023-08-03 DIAGNOSIS — R91.8 MULTIPLE LUNG NODULES ON CT: ICD-10-CM

## 2023-08-03 DIAGNOSIS — E66.01 SEVERE OBESITY (HCC): ICD-10-CM

## 2023-08-03 DIAGNOSIS — Z87.891 HISTORY OF TOBACCO USE: ICD-10-CM

## 2023-08-03 DIAGNOSIS — Z01.811 ENCOUNTER FOR PRE-OPERATIVE RESPIRATORY CLEARANCE: Primary | ICD-10-CM

## 2023-08-03 DIAGNOSIS — F41.9 ANXIETY: Primary | ICD-10-CM

## 2023-08-03 LAB
EXPIRATORY TIME: NORMAL
FEF 25-75% %PRED-PRE: NORMAL
FEF 25-75% PRED: NORMAL
FEF 25-75%-PRE: NORMAL
FEV1 %PRED-PRE: NORMAL %
FEV1 PRED: NORMAL
FEV1/FVC %PRED-PRE: NORMAL
FEV1/FVC PRED: NORMAL
FEV1/FVC: NORMAL %
FEV1: 2.66 L
FVC %PRED-PRE: NORMAL %
FVC PRED: NORMAL
FVC: 3.26 L
PEF %PRED-PRE: NORMAL
PEF PRED: NORMAL
PEF-PRE: NORMAL

## 2023-08-03 ASSESSMENT — PULMONARY FUNCTION TESTS
FVC: 3.26
FEV1: 2.66

## 2023-08-03 NOTE — PROGRESS NOTES
topically daily as needed for rash    betamethasone valerate (VALISONE) 0.1 % lotion Use topically twice daily as needed to irritated or itchy skin/rash. Do not use for more than 7 days in a row    fexofenadine (ALLEGRA) 180 mg, Oral, DAILY PRN    FLUoxetine (PROZAC) 80 mg, Oral, Nightly    fluticasone (FLONASE) 50 MCG/ACT nasal spray 2 sprays, Nasal, DAILY    lamoTRIgine (LAMICTAL) 100 MG tablet No dose, route, or frequency recorded.     losartan-hydroCHLOROthiazide (HYZAAR) 100-12.5 MG per tablet 1 tablet, Oral, Nightly, For BP    montelukast (SINGULAIR) 10 MG tablet TAKE 1 TABLET BY MOUTH AT NIGHT  FOR ALLERGIES    nystatin (MYCOSTATIN) 315859 UNIT/GM powder Apply powder to skin folds 3 times a day as needed for rash    pantoprazole (PROTONIX) 20 MG tablet TAKE 1 TABLET BY MOUTH DAILY  BEFORE BREAKFAST FOR HEARTBURN    solifenacin (VESICARE) 5 MG tablet TAKE 1 TABLET BY MOUTH DAILY FOR A CONDITION WHERE THE URGE TO  URINATE RESULTS IN URINE LEAKAGE    traZODone (DESYREL) 100 mg, Oral, NIGHTLY

## 2023-08-04 ENCOUNTER — INITIAL CONSULT (OUTPATIENT)
Age: 53
End: 2023-08-04
Payer: COMMERCIAL

## 2023-08-04 VITALS
HEIGHT: 69 IN | HEART RATE: 70 BPM | DIASTOLIC BLOOD PRESSURE: 82 MMHG | BODY MASS INDEX: 41.95 KG/M2 | SYSTOLIC BLOOD PRESSURE: 130 MMHG | WEIGHT: 283.2 LBS

## 2023-08-04 DIAGNOSIS — E78.5 HYPERLIPIDEMIA, UNSPECIFIED HYPERLIPIDEMIA TYPE: ICD-10-CM

## 2023-08-04 DIAGNOSIS — I10 HYPERTENSION, UNSPECIFIED TYPE: ICD-10-CM

## 2023-08-04 DIAGNOSIS — Z01.810 PREOPERATIVE CARDIOVASCULAR EXAMINATION: Primary | ICD-10-CM

## 2023-08-04 PROCEDURE — 3079F DIAST BP 80-89 MM HG: CPT | Performed by: INTERNAL MEDICINE

## 2023-08-04 PROCEDURE — 3017F COLORECTAL CA SCREEN DOC REV: CPT | Performed by: INTERNAL MEDICINE

## 2023-08-04 PROCEDURE — 93000 ELECTROCARDIOGRAM COMPLETE: CPT | Performed by: INTERNAL MEDICINE

## 2023-08-04 PROCEDURE — 1036F TOBACCO NON-USER: CPT | Performed by: INTERNAL MEDICINE

## 2023-08-04 PROCEDURE — 3075F SYST BP GE 130 - 139MM HG: CPT | Performed by: INTERNAL MEDICINE

## 2023-08-04 PROCEDURE — G8417 CALC BMI ABV UP PARAM F/U: HCPCS | Performed by: INTERNAL MEDICINE

## 2023-08-04 PROCEDURE — 99204 OFFICE O/P NEW MOD 45 MIN: CPT | Performed by: INTERNAL MEDICINE

## 2023-08-04 PROCEDURE — G8427 DOCREV CUR MEDS BY ELIG CLIN: HCPCS | Performed by: INTERNAL MEDICINE

## 2023-08-07 ENCOUNTER — HOSPITAL ENCOUNTER (OUTPATIENT)
Dept: ULTRASOUND IMAGING | Age: 53
Discharge: HOME | End: 2023-08-10
Attending: FAMILY MEDICINE
Payer: COMMERCIAL

## 2023-08-07 DIAGNOSIS — N95.0 POST-MENOPAUSE BLEEDING: ICD-10-CM

## 2023-08-07 PROCEDURE — 76856 US EXAM PELVIC COMPLETE: CPT

## 2023-08-16 ENCOUNTER — HOSPITAL ENCOUNTER (OUTPATIENT)
Dept: GENERAL RADIOLOGY | Age: 53
Discharge: HOME | End: 2023-08-19
Attending: SURGERY
Payer: COMMERCIAL

## 2023-08-16 DIAGNOSIS — K22.0 CRICOPHARYNGEAL ACHALASIA: Primary | ICD-10-CM

## 2023-08-16 DIAGNOSIS — K21.9 GASTROESOPHAGEAL REFLUX DISEASE, UNSPECIFIED WHETHER ESOPHAGITIS PRESENT: ICD-10-CM

## 2023-08-16 DIAGNOSIS — E66.01 MORBID OBESITY (HCC): ICD-10-CM

## 2023-08-16 DIAGNOSIS — Z01.812 ENCOUNTER FOR PRE-OPERATIVE LABORATORY TESTING: ICD-10-CM

## 2023-08-16 DIAGNOSIS — Z71.89 ENCOUNTER FOR PRE-BARIATRIC SURGERY COUNSELING AND EDUCATION: ICD-10-CM

## 2023-08-16 PROCEDURE — 2500000003 HC RX 250 WO HCPCS: Performed by: PHYSICIAN ASSISTANT

## 2023-08-16 PROCEDURE — 6370000000 HC RX 637 (ALT 250 FOR IP): Performed by: PHYSICIAN ASSISTANT

## 2023-08-16 PROCEDURE — 74246 X-RAY XM UPR GI TRC 2CNTRST: CPT

## 2023-08-16 RX ADMIN — BARIUM SULFATE 140 ML: 980 POWDER, FOR SUSPENSION ORAL at 09:10

## 2023-08-16 RX ADMIN — ANTACID/ANTIFLATULENT 1 EACH: 380; 550; 10; 10 GRANULE, EFFERVESCENT ORAL at 09:10

## 2023-08-16 RX ADMIN — BARIUM SULFATE 355 ML: 0.6 SUSPENSION ORAL at 09:10

## 2023-08-21 RX ORDER — PANTOPRAZOLE SODIUM 20 MG/1
TABLET, DELAYED RELEASE ORAL
Qty: 90 TABLET | Refills: 0 | Status: SHIPPED | OUTPATIENT
Start: 2023-08-21 | End: 2023-10-19

## 2023-08-22 ENCOUNTER — OFFICE VISIT (OUTPATIENT)
Dept: ENT CLINIC | Age: 53
End: 2023-08-22
Payer: COMMERCIAL

## 2023-08-22 VITALS
OXYGEN SATURATION: 95 % | BODY MASS INDEX: 40.43 KG/M2 | RESPIRATION RATE: 19 BRPM | WEIGHT: 273 LBS | HEIGHT: 69 IN | HEART RATE: 87 BPM

## 2023-08-22 DIAGNOSIS — R13.12 OROPHARYNGEAL DYSPHAGIA: ICD-10-CM

## 2023-08-22 DIAGNOSIS — K21.9 LARYNGOPHARYNGEAL REFLUX (LPR): ICD-10-CM

## 2023-08-22 DIAGNOSIS — K22.0 ACHALASIA OF THE CRICOPHARYNGEUS MUSCLE: Primary | ICD-10-CM

## 2023-08-22 PROCEDURE — 31575 DIAGNOSTIC LARYNGOSCOPY: CPT | Performed by: STUDENT IN AN ORGANIZED HEALTH CARE EDUCATION/TRAINING PROGRAM

## 2023-08-22 PROCEDURE — 1036F TOBACCO NON-USER: CPT | Performed by: STUDENT IN AN ORGANIZED HEALTH CARE EDUCATION/TRAINING PROGRAM

## 2023-08-22 PROCEDURE — 3017F COLORECTAL CA SCREEN DOC REV: CPT | Performed by: STUDENT IN AN ORGANIZED HEALTH CARE EDUCATION/TRAINING PROGRAM

## 2023-08-22 PROCEDURE — G8417 CALC BMI ABV UP PARAM F/U: HCPCS | Performed by: STUDENT IN AN ORGANIZED HEALTH CARE EDUCATION/TRAINING PROGRAM

## 2023-08-22 PROCEDURE — 99204 OFFICE O/P NEW MOD 45 MIN: CPT | Performed by: STUDENT IN AN ORGANIZED HEALTH CARE EDUCATION/TRAINING PROGRAM

## 2023-08-22 PROCEDURE — G8427 DOCREV CUR MEDS BY ELIG CLIN: HCPCS | Performed by: STUDENT IN AN ORGANIZED HEALTH CARE EDUCATION/TRAINING PROGRAM

## 2023-08-22 RX ORDER — PANTOPRAZOLE SODIUM 40 MG/1
40 TABLET, DELAYED RELEASE ORAL DAILY
Qty: 120 TABLET | Refills: 2 | Status: SHIPPED | OUTPATIENT
Start: 2023-08-22

## 2023-08-22 ASSESSMENT — ENCOUNTER SYMPTOMS
EYE ITCHING: 0
SINUS PRESSURE: 0
FACIAL SWELLING: 0
EYE PAIN: 0
DIARRHEA: 0
SHORTNESS OF BREATH: 0
STRIDOR: 0
WHEEZING: 0
TROUBLE SWALLOWING: 1
SINUS PAIN: 0
APNEA: 0
NAUSEA: 0
CONSTIPATION: 0
EYE DISCHARGE: 0
COUGH: 0
CHOKING: 0

## 2023-08-22 NOTE — PROGRESS NOTES
HPI:  Hardeep Cornejo is a 47 y.o. female seen New    Chief Complaint   Patient presents with    Dysphagia     Patient presents today with c/o dysphagia that has been ongoing . Patient has had recent EGD that prompted this visit. 60-year-old female seen today as a new patient ENT referral for concern of dysphagia. This has been progressing for a number of months. She does have long-term GERD well-controlled on omeprazole. She recently had an EGD which was seemingly unremarkable. No history of erosive esophagitis or Hager's esophagus. 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 This is prompted ENT referral.  She does have concerns with foods frequently becoming stuck at least a couple times per week to the upper region of her neck. Past Medical History, Past Surgical History, Family history, Social History, and Medications were all reviewed with the patient today and updated as necessary.      No Known Allergies    Patient Active Problem List   Diagnosis    Severe obesity ([address])    Urge incontinence    Vaginal dryness    Major depressive disorder, single episode, unspecified    History of tobacco use    Multiple lung nodules on CT    Insomnia    Hyperlipidemia    Recurrent major depressive disorder, in full remission ([address])    History of gastroesophageal reflux (GERD)    GERD (gastroesophageal reflux disease)    Hypertension       Current Outpatient Medications   Medication Sig    pantoprazole (PROTONIX) 40 MG tablet Take 1 tablet by mouth daily    pantoprazole (PROTONIX) 20 MG tablet TAKE 1 TABLET BY MOUTH nightly FOR HEARTBURN    FLUoxetine (PROZAC) 40 MG capsule Take 2 capsules by mouth at bedtime    traZODone (DESYREL) 100 MG tablet Take 1 tablet by mouth nightly    amLODIPine (NORVASC) 5 MG tablet Take 1 tablet by mouth daily    losartan-hydroCHLOROthiazide (HYZAAR) 100-12.5 MG per tablet Take 1 tablet by mouth at bedtime For BP

## 2023-08-23 RX ORDER — FOLIC ACID 1 MG/1
TABLET ORAL
Qty: 90 TABLET | Refills: 3 | OUTPATIENT
Start: 2023-08-23

## 2023-08-23 RX ORDER — MONTELUKAST SODIUM 10 MG/1
TABLET ORAL
Qty: 90 TABLET | Refills: 0 | Status: SHIPPED | OUTPATIENT
Start: 2023-08-23

## 2023-08-24 ENCOUNTER — OFFICE VISIT (OUTPATIENT)
Dept: SURGERY | Age: 53
End: 2023-08-24
Payer: COMMERCIAL

## 2023-08-24 VITALS
DIASTOLIC BLOOD PRESSURE: 73 MMHG | HEART RATE: 67 BPM | SYSTOLIC BLOOD PRESSURE: 124 MMHG | BODY MASS INDEX: 41.03 KG/M2 | HEIGHT: 69 IN | WEIGHT: 277 LBS

## 2023-08-24 DIAGNOSIS — I10 PRIMARY HYPERTENSION: ICD-10-CM

## 2023-08-24 DIAGNOSIS — E66.01 OBESITY, CLASS III, BMI 40-49.9 (MORBID OBESITY) (HCC): ICD-10-CM

## 2023-08-24 DIAGNOSIS — Z71.82 EXERCISE COUNSELING: ICD-10-CM

## 2023-08-24 DIAGNOSIS — E66.01 MORBID OBESITY (HCC): Primary | ICD-10-CM

## 2023-08-24 DIAGNOSIS — E78.2 MIXED HYPERLIPIDEMIA: ICD-10-CM

## 2023-08-24 DIAGNOSIS — Z71.3 DIETARY COUNSELING: ICD-10-CM

## 2023-08-24 DIAGNOSIS — K21.9 GASTROESOPHAGEAL REFLUX DISEASE, UNSPECIFIED WHETHER ESOPHAGITIS PRESENT: ICD-10-CM

## 2023-08-24 DIAGNOSIS — K59.04 CHRONIC IDIOPATHIC CONSTIPATION: ICD-10-CM

## 2023-08-24 PROCEDURE — 3074F SYST BP LT 130 MM HG: CPT | Performed by: PHYSICIAN ASSISTANT

## 2023-08-24 PROCEDURE — 99214 OFFICE O/P EST MOD 30 MIN: CPT | Performed by: PHYSICIAN ASSISTANT

## 2023-08-24 PROCEDURE — G8427 DOCREV CUR MEDS BY ELIG CLIN: HCPCS | Performed by: PHYSICIAN ASSISTANT

## 2023-08-24 PROCEDURE — G8417 CALC BMI ABV UP PARAM F/U: HCPCS | Performed by: PHYSICIAN ASSISTANT

## 2023-08-24 PROCEDURE — 3017F COLORECTAL CA SCREEN DOC REV: CPT | Performed by: PHYSICIAN ASSISTANT

## 2023-08-24 PROCEDURE — 1036F TOBACCO NON-USER: CPT | Performed by: PHYSICIAN ASSISTANT

## 2023-08-24 PROCEDURE — 3078F DIAST BP <80 MM HG: CPT | Performed by: PHYSICIAN ASSISTANT

## 2023-08-28 ENCOUNTER — PREP FOR PROCEDURE (OUTPATIENT)
Dept: ENT CLINIC | Age: 53
End: 2023-08-28

## 2023-08-28 DIAGNOSIS — R13.12 OROPHARYNGEAL DYSPHAGIA: ICD-10-CM

## 2023-08-28 DIAGNOSIS — K22.0 ACHALASIA OF THE CRICOPHARYNGEUS MUSCLE: Primary | ICD-10-CM

## 2023-08-28 DIAGNOSIS — K21.9 LARYNGOPHARYNGEAL REFLUX (LPR): ICD-10-CM

## 2023-09-11 RX ORDER — OMEPRAZOLE 40 MG/1
40 CAPSULE, DELAYED RELEASE ORAL
Qty: 90 CAPSULE | Refills: 0 | Status: CANCELLED | OUTPATIENT
Start: 2023-09-11

## 2023-09-11 RX ORDER — OXYCODONE HYDROCHLORIDE AND ACETAMINOPHEN 5; 325 MG/1; MG/1
1 TABLET ORAL EVERY 6 HOURS PRN
Qty: 20 TABLET | Refills: 0 | Status: CANCELLED | OUTPATIENT
Start: 2023-09-11 | End: 2023-09-16

## 2023-09-11 NOTE — PROGRESS NOTES
DENTAL/CHEWING ABILITY:  No dental issues with chewing. PRIOR WEIGHT LOSS ATTEMPTS:  Reports 4-10 previous weight loss attempts including medication (Phentermine), Atkins, nutrition counseling, Noom and self-supervised diets with exercise. EXERCISE ASSESSMENT:  Reports current physical activity including walking at work but no formal exercise regimen. Reviewed NIH guidelines. PSYCHOSOCIAL:  She notes she is  and states main support person is her , Porsha Black. She is employed as a  at [employer]. Her goal in pursuing surgical weight loss is to improve health. She denies any active psychological problems and reports appropriate treatment of depression/anxiety. She states she is independent in her care, can drive a car, and can ambulate without assistance. Patient has a long term history of obesity with multiple failed attempts at weight reduction. Patient denies any changes in health history or physical health since last visit. Patient has been adherent to her diet and exercise regimen. Patient feels that she is well informed regarding her bariatric surgery choice and would like to proceed with a laparoscopic mis-en-y gastric bypass for weight reduction, improvement of her medical conditions, and improved quality of life. Patient verbalized understanding of the risks associated with bariatric surgery. Patient also verbalized understanding that bariatric surgery is a tool and that weight reduction is dependent on behavioral changes in regards to what she eats and how much.     PMH:    Past Medical History:   Diagnosis Date    Abnormal Pap smear of cervix     Anxiety     managed with meds    Arthritis     Depression     managed with meds    Heartburn     daily meds    Hypertension     managed with meds    Lung nodule     BENIGN, stable with serial 2 yr f/u, recent MRI stable    Obesity     Rash RASHES    Seasonal allergies      Patient Active Problem List

## 2023-09-12 ENCOUNTER — PREP FOR PROCEDURE (OUTPATIENT)
Dept: SURGERY | Age: 53
End: 2023-09-12

## 2023-09-12 ENCOUNTER — OFFICE VISIT (OUTPATIENT)
Dept: SURGERY | Age: 53
End: 2023-09-12
Payer: COMMERCIAL

## 2023-09-12 VITALS
SYSTOLIC BLOOD PRESSURE: 148 MMHG | HEIGHT: 69 IN | BODY MASS INDEX: 40.14 KG/M2 | DIASTOLIC BLOOD PRESSURE: 98 MMHG | HEART RATE: 70 BPM | WEIGHT: 271 LBS

## 2023-09-12 DIAGNOSIS — Z98.890 POST-OPERATIVE NAUSEA AND VOMITING: ICD-10-CM

## 2023-09-12 DIAGNOSIS — K59.04 CHRONIC IDIOPATHIC CONSTIPATION: ICD-10-CM

## 2023-09-12 DIAGNOSIS — R11.2 POST-OPERATIVE NAUSEA AND VOMITING: ICD-10-CM

## 2023-09-12 DIAGNOSIS — E66.01 MORBID OBESITY (HCC): ICD-10-CM

## 2023-09-12 DIAGNOSIS — E66.01 MORBID OBESITY (HCC): Primary | ICD-10-CM

## 2023-09-12 DIAGNOSIS — E78.2 MIXED HYPERLIPIDEMIA: ICD-10-CM

## 2023-09-12 DIAGNOSIS — E66.01 OBESITY, CLASS III, BMI 40-49.9 (MORBID OBESITY) (HCC): ICD-10-CM

## 2023-09-12 DIAGNOSIS — K21.9 GASTROESOPHAGEAL REFLUX DISEASE, UNSPECIFIED WHETHER ESOPHAGITIS PRESENT: ICD-10-CM

## 2023-09-12 DIAGNOSIS — I10 PRIMARY HYPERTENSION: ICD-10-CM

## 2023-09-12 DIAGNOSIS — G89.18 POST-OPERATIVE PAIN: ICD-10-CM

## 2023-09-12 PROCEDURE — 3080F DIAST BP >= 90 MM HG: CPT | Performed by: SURGERY

## 2023-09-12 PROCEDURE — G8417 CALC BMI ABV UP PARAM F/U: HCPCS | Performed by: SURGERY

## 2023-09-12 PROCEDURE — G8427 DOCREV CUR MEDS BY ELIG CLIN: HCPCS | Performed by: SURGERY

## 2023-09-12 PROCEDURE — 3017F COLORECTAL CA SCREEN DOC REV: CPT | Performed by: SURGERY

## 2023-09-12 PROCEDURE — 1036F TOBACCO NON-USER: CPT | Performed by: SURGERY

## 2023-09-12 PROCEDURE — 3077F SYST BP >= 140 MM HG: CPT | Performed by: SURGERY

## 2023-09-12 PROCEDURE — APPSS45 APP SPLIT SHARED TIME 31-45 MINUTES: Performed by: PHYSICIAN ASSISTANT

## 2023-09-12 PROCEDURE — 99215 OFFICE O/P EST HI 40 MIN: CPT | Performed by: SURGERY

## 2023-09-12 RX ORDER — HYDROCODONE BITARTRATE AND ACETAMINOPHEN 5; 325 MG/1; MG/1
1 TABLET ORAL EVERY 6 HOURS PRN
Qty: 20 TABLET | Refills: 0 | Status: SHIPPED | OUTPATIENT
Start: 2023-09-12 | End: 2023-09-17

## 2023-09-12 RX ORDER — ONDANSETRON 4 MG/1
4 TABLET, FILM COATED ORAL 3 TIMES DAILY PRN
Qty: 30 TABLET | Refills: 0 | Status: SHIPPED | OUTPATIENT
Start: 2023-09-12

## 2023-10-11 RX ORDER — SOLIFENACIN SUCCINATE 5 MG/1
TABLET, FILM COATED ORAL
Qty: 90 TABLET | Refills: 0 | Status: SHIPPED | OUTPATIENT
Start: 2023-10-11

## 2023-10-19 NOTE — PERIOP NOTE
Dear Mrs. Rachele Sarah,      Thank you for completing your phone assessment with me today. Here are your requested surgery instructions. Please call #[phone] with any questions/concerns. Your surgery is scheduled at [facility]: [address], [address]. Please arrive at Outpatient Entrance. The Pre-op department (#[phone] for MAIN) will call you on the business day before your surgery with your arrival time. If you have any questions on the day of surgery, please call the pre-op department at the telephone number above. If you have not received a telephone call from Outpatient by 3 pm the business day before surgery, please call Outpatient # listed above. If you are sick the day of surgery: fever >100 deg F, coughing up colored mucus, or have abdominal sickness (intractable nausea, vomiting or diarrhea) please call [phone] the day of surgery as early as possible and speak to a nurse about your symptoms. They will advise you on next steps. No food or drink after midnight which includes any gum, mints, candy, or ice chips. Please take these medications on the morning of surgery with a small sip of water: adderall, bring albuterol inhaler day of surgery. On the day before surgery take Acetaminophen 1000mg in the morning and at bedtime OR Acetaminophen 650mg in the morning, afternoon and bedtime. Please stop all vitamins/supplements 7 days prior to surgery and stop all NSAIDS (ibuprofen, naproxen, aleve, motrin, advil) 5 days before your surgery. A responsible adult must drive you to the hospital, remain in the building during surgery and you will need adult supervision for 24 hours after anesthesia. Please use an antibacterial soap (Dial, Safeguard, etc.) the night before surgery and on the morning of surgery. Change sheets on your bed night before surgery.    Do NOT wear:  make-up, nail polish, lotions, cologne, perfumes, powders or oil on

## 2023-10-19 NOTE — PERIOP NOTE
Patient verified name and . Order for consent found in EHR and matches case posting; patient verifies procedure. Type 1B surgery, phone assessment complete. Orders received. Labs per surgeon: Unknown  Labs per anesthesia protocol: Potassium DOS     Patient answered medical/surgical history questions at their best of ability. All prior to admission medications documented in [facility]. If you have not heard from [address] by 2-3 pm, please call [phone]. No food or drink after midnight night before surgery, which includes any gum, mints, candy, or ice chips. Patient instructed to take the following medications the day of surgery according to anesthesia guidelines with a small sip of water: adderall, bring inhaler day of surgery. On the day before surgery please take Acetaminophen 1000mg in the morning and then again before bed. You may substitute for Tylenol 650 mg. Hold all vitamins 7 days prior to surgery and NSAIDS 5 days prior to surgery. Prescription meds to hold:     Patient instructed on the following:    > Arrive at [facility], time of arrival to be called the day before by 1700  > NPO after midnight, unless otherwise indicated, including gum, mints, and ice chips  > Responsible adult must drive patient to the hospital, stay during surgery, and patient will need supervision 24 hours after anesthesia  > Use antibacterial soap in shower the night before surgery and on the morning of surgery  > All piercings must be removed prior to arrival.    > Leave all valuables (money and jewelry) at home but bring insurance card and ID on DOS.   > You may be required to pay a deductible or co-pay on the day of your procedure. You can pre-pay by calling [phone] if your surgery is at the [facility] or [phone] if your surgery is at the [facility]. > Do not wear make-up, nail polish, lotions, cologne, perfumes, powders, or oil on skin. Artificial nails are not permitted. Please do not wear any jewelry.

## 2023-10-19 NOTE — PERIOP NOTE
Preop department called to notify patient of arrival time for scheduled procedure. Instructions given to   - Arrive at [address] [facility]. - Remain NPO after midnight, unless otherwise indicated, including gum, mints, and ice chips. - Have a responsible adult to drive patient to the hospital, stay during surgery, and patient will need supervision 24 hours after anesthesia. - Use antibacterial soap in shower the night before surgery and on the morning of surgery.        Was patient contacted: pt  Voicemail left:   Numbers contacted: [phone]   Arrival time: 5600

## 2023-10-20 ENCOUNTER — ANESTHESIA EVENT (OUTPATIENT)
Dept: SURGERY | Age: 53
End: 2023-10-20
Payer: COMMERCIAL

## 2023-10-20 ENCOUNTER — HOSPITAL ENCOUNTER (OUTPATIENT)
Age: 53
Setting detail: OUTPATIENT SURGERY
Discharge: HOME | End: 2023-10-20
Attending: STUDENT IN AN ORGANIZED HEALTH CARE EDUCATION/TRAINING PROGRAM | Admitting: STUDENT IN AN ORGANIZED HEALTH CARE EDUCATION/TRAINING PROGRAM
Payer: COMMERCIAL

## 2023-10-20 ENCOUNTER — ANESTHESIA (OUTPATIENT)
Dept: SURGERY | Age: 53
End: 2023-10-20
Payer: COMMERCIAL

## 2023-10-20 VITALS
SYSTOLIC BLOOD PRESSURE: 134 MMHG | HEIGHT: 68 IN | RESPIRATION RATE: 16 BRPM | HEART RATE: 67 BPM | WEIGHT: 271 LBS | TEMPERATURE: 98.6 F | DIASTOLIC BLOOD PRESSURE: 58 MMHG | BODY MASS INDEX: 41.07 KG/M2 | OXYGEN SATURATION: 97 %

## 2023-10-20 DIAGNOSIS — K21.9 LARYNGOPHARYNGEAL REFLUX (LPR): ICD-10-CM

## 2023-10-20 DIAGNOSIS — K22.0 ACHALASIA OF THE CRICOPHARYNGEUS MUSCLE: ICD-10-CM

## 2023-10-20 DIAGNOSIS — R13.12 OROPHARYNGEAL DYSPHAGIA: ICD-10-CM

## 2023-10-20 DIAGNOSIS — K22.0 ACHALASIA AND CARDIOSPASM: ICD-10-CM

## 2023-10-20 LAB — POTASSIUM BLD-SCNC: 4.2 MMOL/L (ref 3.5–5.1)

## 2023-10-20 PROCEDURE — 6360000002 HC RX W HCPCS: Performed by: STUDENT IN AN ORGANIZED HEALTH CARE EDUCATION/TRAINING PROGRAM

## 2023-10-20 PROCEDURE — 3700000000 HC ANESTHESIA ATTENDED CARE: Performed by: STUDENT IN AN ORGANIZED HEALTH CARE EDUCATION/TRAINING PROGRAM

## 2023-10-20 PROCEDURE — 84132 ASSAY OF SERUM POTASSIUM: CPT

## 2023-10-20 PROCEDURE — 2580000003 HC RX 258: Performed by: ANESTHESIOLOGY

## 2023-10-20 PROCEDURE — 2500000003 HC RX 250 WO HCPCS

## 2023-10-20 PROCEDURE — 3600000013 HC SURGERY LEVEL 3 ADDTL 15MIN: Performed by: STUDENT IN AN ORGANIZED HEALTH CARE EDUCATION/TRAINING PROGRAM

## 2023-10-20 PROCEDURE — 3700000001 HC ADD 15 MINUTES (ANESTHESIA): Performed by: STUDENT IN AN ORGANIZED HEALTH CARE EDUCATION/TRAINING PROGRAM

## 2023-10-20 PROCEDURE — 7100000010 HC PHASE II RECOVERY - FIRST 15 MIN: Performed by: STUDENT IN AN ORGANIZED HEALTH CARE EDUCATION/TRAINING PROGRAM

## 2023-10-20 PROCEDURE — 6360000002 HC RX W HCPCS

## 2023-10-20 PROCEDURE — 7100000001 HC PACU RECOVERY - ADDTL 15 MIN: Performed by: STUDENT IN AN ORGANIZED HEALTH CARE EDUCATION/TRAINING PROGRAM

## 2023-10-20 PROCEDURE — 31526 DX LARYNGOSCOPY W/OPER SCOPE: CPT | Performed by: STUDENT IN AN ORGANIZED HEALTH CARE EDUCATION/TRAINING PROGRAM

## 2023-10-20 PROCEDURE — 2709999900 HC NON-CHARGEABLE SUPPLY: Performed by: STUDENT IN AN ORGANIZED HEALTH CARE EDUCATION/TRAINING PROGRAM

## 2023-10-20 PROCEDURE — 3600000003 HC SURGERY LEVEL 3 BASE: Performed by: STUDENT IN AN ORGANIZED HEALTH CARE EDUCATION/TRAINING PROGRAM

## 2023-10-20 PROCEDURE — 43450 DILATE ESOPHAGUS 1/MULT PASS: CPT | Performed by: STUDENT IN AN ORGANIZED HEALTH CARE EDUCATION/TRAINING PROGRAM

## 2023-10-20 PROCEDURE — 7100000000 HC PACU RECOVERY - FIRST 15 MIN: Performed by: STUDENT IN AN ORGANIZED HEALTH CARE EDUCATION/TRAINING PROGRAM

## 2023-10-20 PROCEDURE — 2580000003 HC RX 258

## 2023-10-20 PROCEDURE — 64616 CHEMODENERV MUSC NECK DYSTON: CPT | Performed by: STUDENT IN AN ORGANIZED HEALTH CARE EDUCATION/TRAINING PROGRAM

## 2023-10-20 RX ORDER — DEXMEDETOMIDINE HYDROCHLORIDE 100 UG/ML
INJECTION, SOLUTION INTRAVENOUS PRN
Status: DISCONTINUED | OUTPATIENT
Start: 2023-10-20 | End: 2023-10-20 | Stop reason: SDUPTHER

## 2023-10-20 RX ORDER — OXYCODONE HYDROCHLORIDE 5 MG/1
5 TABLET ORAL
Status: DISCONTINUED | OUTPATIENT
Start: 2023-10-20 | End: 2023-10-20 | Stop reason: HOSPADM

## 2023-10-20 RX ORDER — SODIUM CHLORIDE, SODIUM LACTATE, POTASSIUM CHLORIDE, CALCIUM CHLORIDE 600; 310; 30; 20 MG/100ML; MG/100ML; MG/100ML; MG/100ML
INJECTION, SOLUTION INTRAVENOUS CONTINUOUS
Status: DISCONTINUED | OUTPATIENT
Start: 2023-10-20 | End: 2023-10-20 | Stop reason: HOSPADM

## 2023-10-20 RX ORDER — FENTANYL CITRATE 50 UG/ML
100 INJECTION, SOLUTION INTRAMUSCULAR; INTRAVENOUS PRN
Status: DISCONTINUED | OUTPATIENT
Start: 2023-10-20 | End: 2023-10-20 | Stop reason: HOSPADM

## 2023-10-20 RX ORDER — PROPOFOL 10 MG/ML
INJECTION, EMULSION INTRAVENOUS PRN
Status: DISCONTINUED | OUTPATIENT
Start: 2023-10-20 | End: 2023-10-20 | Stop reason: SDUPTHER

## 2023-10-20 RX ORDER — SODIUM CHLORIDE, SODIUM LACTATE, POTASSIUM CHLORIDE, CALCIUM CHLORIDE 600; 310; 30; 20 MG/100ML; MG/100ML; MG/100ML; MG/100ML
INJECTION, SOLUTION INTRAVENOUS CONTINUOUS PRN
Status: DISCONTINUED | OUTPATIENT
Start: 2023-10-20 | End: 2023-10-20 | Stop reason: SDUPTHER

## 2023-10-20 RX ORDER — GLYCOPYRROLATE 0.2 MG/ML
INJECTION INTRAMUSCULAR; INTRAVENOUS PRN
Status: DISCONTINUED | OUTPATIENT
Start: 2023-10-20 | End: 2023-10-20 | Stop reason: SDUPTHER

## 2023-10-20 RX ORDER — LIDOCAINE HYDROCHLORIDE 10 MG/ML
1 INJECTION, SOLUTION INFILTRATION; PERINEURAL
Status: DISCONTINUED | OUTPATIENT
Start: 2023-10-20 | End: 2023-10-20 | Stop reason: HOSPADM

## 2023-10-20 RX ORDER — ONDANSETRON 2 MG/ML
INJECTION INTRAMUSCULAR; INTRAVENOUS PRN
Status: DISCONTINUED | OUTPATIENT
Start: 2023-10-20 | End: 2023-10-20 | Stop reason: SDUPTHER

## 2023-10-20 RX ORDER — HYDROMORPHONE HYDROCHLORIDE 2 MG/ML
0.5 INJECTION, SOLUTION INTRAMUSCULAR; INTRAVENOUS; SUBCUTANEOUS EVERY 5 MIN PRN
Status: DISCONTINUED | OUTPATIENT
Start: 2023-10-20 | End: 2023-10-20 | Stop reason: HOSPADM

## 2023-10-20 RX ORDER — MIDAZOLAM HYDROCHLORIDE 2 MG/2ML
2 INJECTION, SOLUTION INTRAMUSCULAR; INTRAVENOUS
Status: DISCONTINUED | OUTPATIENT
Start: 2023-10-20 | End: 2023-10-20 | Stop reason: HOSPADM

## 2023-10-20 RX ORDER — DEXAMETHASONE SODIUM PHOSPHATE 4 MG/ML
INJECTION, SOLUTION INTRA-ARTICULAR; INTRALESIONAL; INTRAMUSCULAR; INTRAVENOUS; SOFT TISSUE PRN
Status: DISCONTINUED | OUTPATIENT
Start: 2023-10-20 | End: 2023-10-20 | Stop reason: SDUPTHER

## 2023-10-20 RX ORDER — SUCCINYLCHOLINE/SOD CL,ISO/PF 200MG/10ML
SYRINGE (ML) INTRAVENOUS PRN
Status: DISCONTINUED | OUTPATIENT
Start: 2023-10-20 | End: 2023-10-20 | Stop reason: SDUPTHER

## 2023-10-20 RX ORDER — ONDANSETRON 2 MG/ML
4 INJECTION INTRAMUSCULAR; INTRAVENOUS
Status: DISCONTINUED | OUTPATIENT
Start: 2023-10-20 | End: 2023-10-20 | Stop reason: HOSPADM

## 2023-10-20 RX ORDER — LIDOCAINE HYDROCHLORIDE 20 MG/ML
INJECTION, SOLUTION EPIDURAL; INFILTRATION; INTRACAUDAL; PERINEURAL PRN
Status: DISCONTINUED | OUTPATIENT
Start: 2023-10-20 | End: 2023-10-20 | Stop reason: SDUPTHER

## 2023-10-20 RX ORDER — ROCURONIUM BROMIDE 10 MG/ML
INJECTION, SOLUTION INTRAVENOUS PRN
Status: DISCONTINUED | OUTPATIENT
Start: 2023-10-20 | End: 2023-10-20 | Stop reason: SDUPTHER

## 2023-10-20 RX ORDER — NEOSTIGMINE METHYLSULFATE 1 MG/ML
INJECTION, SOLUTION INTRAVENOUS PRN
Status: DISCONTINUED | OUTPATIENT
Start: 2023-10-20 | End: 2023-10-20 | Stop reason: SDUPTHER

## 2023-10-20 RX ORDER — FENTANYL CITRATE 50 UG/ML
INJECTION, SOLUTION INTRAMUSCULAR; INTRAVENOUS PRN
Status: DISCONTINUED | OUTPATIENT
Start: 2023-10-20 | End: 2023-10-20 | Stop reason: SDUPTHER

## 2023-10-20 RX ORDER — FENTANYL CITRATE 50 UG/ML
50 INJECTION, SOLUTION INTRAMUSCULAR; INTRAVENOUS PRN
Status: DISCONTINUED | OUTPATIENT
Start: 2023-10-20 | End: 2023-10-20 | Stop reason: HOSPADM

## 2023-10-20 RX ADMIN — GLYCOPYRROLATE 0.4 MG: 0.2 INJECTION INTRAMUSCULAR; INTRAVENOUS at 12:36

## 2023-10-20 RX ADMIN — DEXMEDETOMIDINE 8 MCG: 100 INJECTION, SOLUTION, CONCENTRATE INTRAVENOUS at 12:25

## 2023-10-20 RX ADMIN — Medication 3 MG: at 12:36

## 2023-10-20 RX ADMIN — ONDANSETRON 4 MG: 2 INJECTION INTRAMUSCULAR; INTRAVENOUS at 12:15

## 2023-10-20 RX ADMIN — FENTANYL CITRATE 50 MCG: 50 INJECTION, SOLUTION INTRAMUSCULAR; INTRAVENOUS at 12:00

## 2023-10-20 RX ADMIN — SODIUM CHLORIDE, POTASSIUM CHLORIDE, SODIUM LACTATE AND CALCIUM CHLORIDE: 600; 310; 30; 20 INJECTION, SOLUTION INTRAVENOUS at 11:48

## 2023-10-20 RX ADMIN — ROCURONIUM BROMIDE 20 MG: 50 INJECTION, SOLUTION INTRAVENOUS at 12:21

## 2023-10-20 RX ADMIN — DEXAMETHASONE SODIUM PHOSPHATE 4 MG: 4 INJECTION INTRA-ARTICULAR; INTRALESIONAL; INTRAMUSCULAR; INTRAVENOUS; SOFT TISSUE at 12:15

## 2023-10-20 RX ADMIN — LIDOCAINE HYDROCHLORIDE 100 MG: 20 INJECTION, SOLUTION EPIDURAL; INFILTRATION; INTRACAUDAL; PERINEURAL at 12:04

## 2023-10-20 RX ADMIN — PROPOFOL 200 MG: 10 INJECTION, EMULSION INTRAVENOUS at 12:04

## 2023-10-20 RX ADMIN — SODIUM CHLORIDE, SODIUM LACTATE, POTASSIUM CHLORIDE, AND CALCIUM CHLORIDE: 600; 310; 30; 20 INJECTION, SOLUTION INTRAVENOUS at 11:57

## 2023-10-20 RX ADMIN — Medication 170 MG: at 12:04

## 2023-10-20 RX ADMIN — PROPOFOL 100 MG: 10 INJECTION, EMULSION INTRAVENOUS at 12:19

## 2023-10-20 RX ADMIN — DEXMEDETOMIDINE 8 MCG: 100 INJECTION, SOLUTION, CONCENTRATE INTRAVENOUS at 12:21

## 2023-10-20 RX ADMIN — PROPOFOL 50 MG: 10 INJECTION, EMULSION INTRAVENOUS at 12:21

## 2023-10-20 RX ADMIN — FENTANYL CITRATE 50 MCG: 50 INJECTION, SOLUTION INTRAMUSCULAR; INTRAVENOUS at 12:09

## 2023-10-20 ASSESSMENT — ENCOUNTER SYMPTOMS
CONSTIPATION: 0
EYE PAIN: 0
SINUS PRESSURE: 0
EYE ITCHING: 0
EYE DISCHARGE: 0
NAUSEA: 0
STRIDOR: 0
APNEA: 0
DIARRHEA: 0
CHOKING: 0
SHORTNESS OF BREATH: 0
COUGH: 0
FACIAL SWELLING: 0
TROUBLE SWALLOWING: 1
SINUS PAIN: 0
WHEEZING: 0

## 2023-10-20 ASSESSMENT — PAIN SCALES - GENERAL: PAINLEVEL_OUTOF10: 0

## 2023-10-20 NOTE — ANESTHESIA PRE PROCEDURE
Department of Anesthesiology  Preprocedure Note       Name:  Isaias Ramos   Age:  47 y.o.  :  1970                                          MRN:  [id number]         Date:  10/20/2023      Surgeon: Cristóbal Amaro):  Kyle James DO    Procedure: Procedure(s):  Direct suspension laryngoscopy/hypopharyngoscopy with Botox injection of cricopharyngeal muscle  ESOPHAGOSCOPY DILATATION    Medications prior to admission:   Prior to Admission medications    Medication Sig Start Date End Date Taking? Authorizing Provider   solifenacin (VESICARE) 5 MG tablet TAKE 1 TABLET BY MOUTH DAILY FOR A CONDITION WHERE THE URGE TO  URINATE RESULTS IN URINE LEAKAGE 10/11/23   Gabrielle Galan DO   ondansetron (ZOFRAN) 4 MG tablet Take 1 tablet by mouth 3 times daily as needed for Nausea or Vomiting 23   Catherine, [address] [place] [address] PA   montelukast (SINGULAIR) 10 MG tablet TAKE 1 TABLET BY MOUTH AT NIGHT  FOR ALLERGIES 23   Gabrielle Galan DO   pantoprazole (PROTONIX) 40 MG tablet Take 1 tablet by mouth daily 23   Kyle James DO   FLUoxetine (PROZAC) 40 MG capsule Take 2 capsules by mouth at bedtime    Provider, MD Ana Cristina   traZODone (DESYREL) 100 MG tablet Take 1 tablet by mouth nightly    ProviderAna Cristina MD   amLODIPine (NORVASC) 5 MG tablet Take 1 tablet by mouth daily  Patient taking differently: Take 1 tablet by mouth every evening 23   Gabrielle Galan DO   losartan-hydroCHLOROthiazide [facility]) 100-12.5 MG per tablet Take 1 tablet by mouth at bedtime For BP 23   Gabrielle Galan DO   ADDERALL XR 10 MG capsule Take 1 capsule by mouth every morning.  23   ProviderAna Cristina MD   lamoTRIgine (LAMICTAL) 100 MG tablet Take 1 tablet by mouth every evening 23   Ana Cristina Vuong MD   betamethasone dipropionate 0.05 % ointment Apply topically daily as needed for rash 23   Gabrielle Galan DO   fluticasone [facility]) 50 MCG/ACT nasal spray 2 sprays by

## 2023-10-20 NOTE — DISCHARGE INSTRUCTIONS
ACTIVITY  As tolerated and as directed by your doctor. Bathe or shower as directed by your doctor. DIET  Clear liquids until no nausea or vomiting; then light diet for the first day. Advance to regular diet on second day, unless your doctor orders otherwise. If nausea and vomiting continues, call your doctor. PAIN  Take pain medication as directed by your doctor. Call your doctor if pain is NOT relieved by medication. DO NOT take aspirin of blood thinners unless directed by your doctor. MEDICATION INTERACTION:During your procedure you potentially received a medication or medications which may reduce the effectiveness of oral contraceptives. Please consider other forms of contraception for 1 month following your procedure if you are currently using oral contraceptives as your primary form of birth control. In addition to this, we recommend continuing your oral contraceptive as prescribed, unless otherwise instructed by your physician, during this time      [address] [facility] [address] IF   Excessive bleeding that does not stop after holding pressure over the area  Temperature of 101 degrees F or above  Excessive redness, swelling or bruising, and/ or green or yellow, smelly discharge from incision    After general anesthesia or intravenous sedation, for 24 hours or while taking prescription Narcotics:  Limit your activities  A responsible adult needs to be with you for the next 24 hours  Do not drive and operate hazardous machinery  Do not make important personal or business decisions  Do not drink alcoholic beverages  If you have not urinated within 8 hours after discharge, and you are experiencing discomfort from urinary retention, please go to the nearest ED. If you have sleep apnea and have a CPAP machine, please use it for all naps and sleeping. Please use caution when taking narcotics and any of your home medications that may cause drowsiness.   *  Please give a list of your current medications to

## 2023-10-20 NOTE — H&P
UPPER GASTROINTESTINAL SERIES WITH KUB:     CLINICAL HISTORY:  Preoperative evaluation for bariatric surgery. COMPARISON:  Report of abdominopelvic CT dated March 3, 2021 from [facility] without  images for direct comparison. TECHNIQUE:  Fluoroscopy time was 3.8 minutes with 19 spot images. BARIUM SWALLOW: Rapid-sequence and imaging of the swallowing mechanism showed  that deglutition was prompt and normal.  However persistent focal posterior  effacement of the cervical esophagus with intermittent proximal dilatation  suggests cricopharyngeal achalasia. A small anterior cervical web is  incidentally noted just distal to it. There are also small bilateral  pharyngoceles. Esophageal motility and morphology was otherwise unremarkable. No hiatal hernia was seen, nor was gastroesophageal reflux observed during the  examination. UGI: Preliminary AP supine abdominal film demonstrates no dilated bowel loops. Rounded radiopaque foreign body projected over the right upper quadrant which  cannot be associated with an ID badge outside the patient. The stomach appeared  normal in contour and mucosal relief. No fixed filling defect or mass lesion  was evident. The duodenal bulb distended fully without deformity, and the  mucosal fold pattern was normal in the duodenal sweep. IMPRESSION:     1. 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 INCIDENTAL SMALL BILATERAL  PHARYNGOCELES AND SMALL DISTAL CERVICAL ESOPHAGEAL ANTERIOR WITHOUT ARE ALSO  NOTED. 2. OTHERWISE UNREMARKABLE EXAMINATION WITH NO DEMONSTRABLE HIATAL HERNIA OR  GASTROESOPHAGEAL REFLUX. BARIUM SWALLOW,  AIR-CONTRAST UPPER GASTROINTESTINAL SERIES WITH KUB:     CLINICAL HISTORY:  Preoperative evaluation for bariatric surgery. COMPARISON:  Report of abdominopelvic CT dated March 3, 2021 from [facility] without  images for direct comparison. healthy TVCs. No nodules or polyps and the cords were fully mobile. Airway widely patent. No concerning lesions seen along post-cricoid region or within either piriform sinus. The posterior pharyngeal was clear as well. The scope was then carefully removed. The patient tolerated the procedure well and there were no complications. ASSESSMENT and PLAN        ICD-10-CM    1. Achalasia of the cricopharyngeus muscle  K22.0       2. Oropharyngeal dysphagia  R13.12 LARYNGOSCOPY,FLEX FIBER,DIAGNOSTIC      3. Laryngopharyngeal reflux (LPR)  K21.9           Outside of mild inflammatory mucosal changes typical of LPR/GERD she was reassured of no concerns on today's evaluation including her flexible laryngoscopy exam.  Very long-term GERD well-controlled on omeprazole. Given symptoms of worsening dysphagia and mild inflammatory changes of the mucosa on today's laryngoscopy I have offered her increased dose of omeprazole 40 mg daily to be taken on a consistent daily basis. This is been sent to her pharmacy. We have discussed her recent findings on her barium esophagram.  This shows achalasia particular at the location of her cricopharyngeal muscle. This lines up with her stated history and location of concerns. I believe it is reasonable to go forward with a direct suspension laryngoscopy/rigid esophagoscopy with esophageal dilation and Botox injection. I discussed all the risks of laryngoscopy/esophagoscopy including bleeding, pharyngeal perforation, hoarseness, damage to teeth/lips/gums, potentially worsening or temporary improvement to dysphagia, and need for further procedures and she would like to proceed.     Theresa Swift, DO  8/28/2023

## 2023-10-20 NOTE — ANESTHESIA POSTPROCEDURE EVALUATION
Department of Anesthesiology  Postprocedure Note    Patient: Donna Parsons  MRN: [id number]  YOB: 1970  Date of evaluation: 10/20/2023      Procedure Summary     Date: 10/20/23 Room / Location: [facility] OP OR 04 / [facility]    Anesthesia Start: 1155 Anesthesia Stop: 1253    Procedures:       Direct suspension laryngoscopy/hypopharyngoscopy with Botox injection of cricopharyngeal muscle (Bilateral: Throat)      ESOPHAGOSCOPY DILATATION (Bilateral: Mouth) Diagnosis:       Achalasia and cardiospasm      Oropharyngeal dysphagia      (Achalasia and cardiospasm [K22.0])      (Oropharyngeal dysphagia [R13.12])    Surgeons: Tere Braxton DO Responsible Provider: Haily Mayorga MD    Anesthesia Type: General ASA Status: 3          Anesthesia Type: General    Ilan Phase I: Ilan Score: 8    Ilan Phase II: Ilan Score: 10      Anesthesia Post Evaluation    Patient location during evaluation: PACU  Patient participation: complete - patient participated  Level of consciousness: awake  Pain score: 0  Airway patency: patent  Nausea & Vomiting: no nausea and no vomiting  Complications: no  Cardiovascular status: blood pressure returned to baseline and hemodynamically stable  Respiratory status: acceptable, spontaneous ventilation and nonlabored ventilation  Hydration status: euvolemic  Multimodal analgesia pain management approach  Pain management: adequate

## 2023-10-21 NOTE — OP NOTE
[facility]  OPERATIVE REPORT    Name:  Kylah Buitrago  MR#:  [id number]  :  1970  ACCOUNT #:  [de-identified]  DATE OF SERVICE:  10/20/2023    LOCATION:  [facility]    PREOPERATIVE DIAGNOSES:  Dysphagia, cricopharyngeal muscle achalasia. POSTOPERATIVE DIAGNOSIS:  Dysphagia, cricopharyngeal muscle achalasia. PROCEDURE PERFORMED:  Direct suspension laryngoscopy with telescopic visualization of upper esophageal sphincter, cricopharyngeal muscle Botox injection, and esophageal dilation. SURGEON:  Javed Byers DO    ASSISTANT:  None. ANESTHESIA:  General anesthesia with endotracheal tube. COMPLICATIONS:  None. SPECIMENS REMOVED:  None. IMPLANTS:  none. ESTIMATED BLOOD LOSS:  Minimal.    DISPOSITION:  Stable to PACU. FINDINGS:  Large cricopharyngeal bar. INDICATIONS:  This is a 76-year-old female who has had worsening episodic dysphagia with food becoming stuck and difficulty with belching, who had a workup with barium swallow, has a very large tight cricopharyngeal achalasia. There had been no improvement with continued modified diet and swallowing therapy. Therefore, risks, benefits, and alternatives were reviewed for undergoing direct laryngoscopy, esophageal dilation, and cricopharyngeal muscle Botox injection. Informed consent was obtained and signed. She was scheduled for the operating room. PROCEDURE:  The patient was brought from the preoperative holding area to the operating room and laid supine on the operating table by Anesthesia team.  General anesthesia was induced. An endotracheal tube was placed. A time-out was then performed. She was then prepped and draped in the usual sterile fashion. She was placed in the supine position with slight flexion of her neck and extension of the head.   A maxillary dental guard was placed to the upper teeth, and a direct Pilling laryngoscope was used, directed towards the endotracheal tube beyond the tongue base and towards the hypopharynx. A zero degree rigid endoscope was used for high powered visualization. Direct laryngoscopy view was seen with no abnormalities to the glottic or supraglottic structures and going posterior to the arytenoids. Hypopharyngoscopy was then performed with good visualization towards the area of the cricopharyngeus muscle, which was noted to be rather tight and large in nature. A 100-unit vial of Botox was obtained, and 2.5 mL of normal sterile saline was placed into the vial for dilution and at this point in time, using a 22-gauge spinal needle, approximately 0.4 mL total of the diluted Botox was placed into the cricopharyngeus musculature, both at the midline and lateral aspects, which was a total of 16 Botox units given. The laryngoscope was brought from its suspension from the [facility] stand and out of the oral cavity with no damage to the teeth and at this point, [facility] dilators were then used to serially dilate the esophagus starting at 28-Syrian and dilating slowly up to 40-Syrian. There was noted moderate resistance at 40-Syrian and then this was discontinued. At this point in time, the care of the patient was transferred back over to Anesthesia where she awoke from anesthesia with no complications and was extubated successfully. She was then transferred to PACU in stable condition.       DO RADHA [email or web address]  D:  10/21/2023 7:54  T:  10/21/2023 15:13  JOB #:  [id number]

## 2023-11-11 DIAGNOSIS — I10 ESSENTIAL HYPERTENSION: ICD-10-CM

## 2023-11-11 DIAGNOSIS — J30.2 SEASONAL ALLERGIES: ICD-10-CM

## 2023-11-13 ENCOUNTER — HOSPITAL ENCOUNTER (OUTPATIENT)
Dept: SURGERY | Age: 53
Discharge: HOME | End: 2023-11-16
Payer: COMMERCIAL

## 2023-11-13 VITALS
DIASTOLIC BLOOD PRESSURE: 74 MMHG | SYSTOLIC BLOOD PRESSURE: 133 MMHG | WEIGHT: 267 LBS | HEIGHT: 69 IN | HEART RATE: 63 BPM | OXYGEN SATURATION: 94 % | TEMPERATURE: 97.3 F | RESPIRATION RATE: 18 BRPM | BODY MASS INDEX: 39.55 KG/M2

## 2023-11-13 LAB
ANION GAP SERPL CALC-SCNC: 6 MMOL/L (ref 2–11)
BUN SERPL-MCNC: 17 MG/DL (ref 6–23)
CALCIUM SERPL-MCNC: 8.9 MG/DL (ref 8.3–10.4)
CHLORIDE SERPL-SCNC: 102 MMOL/L (ref 101–110)
CO2 SERPL-SCNC: 30 MMOL/L (ref 21–32)
CREAT SERPL-MCNC: 0.99 MG/DL (ref 0.6–1)
ERYTHROCYTE [DISTWIDTH] IN BLOOD BY AUTOMATED COUNT: 13.3 % (ref 11.9–14.6)
GLUCOSE SERPL-MCNC: 90 MG/DL (ref 65–100)
HCT VFR BLD AUTO: 35.5 % (ref 35.8–46.3)
HGB BLD-MCNC: 11.4 G/DL (ref 11.7–15.4)
MCH RBC QN AUTO: 28.6 PG (ref 26.1–32.9)
MCHC RBC AUTO-ENTMCNC: 32.1 G/DL (ref 31.4–35)
MCV RBC AUTO: 89.2 FL (ref 82–102)
NRBC # BLD: 0 K/UL (ref 0–0.2)
PLATELET # BLD AUTO: 275 K/UL (ref 150–450)
PMV BLD AUTO: 11.8 FL (ref 9.4–12.3)
POTASSIUM SERPL-SCNC: 3.7 MMOL/L (ref 3.5–5.1)
RBC # BLD AUTO: 3.98 M/UL (ref 4.05–5.2)
SODIUM SERPL-SCNC: 138 MMOL/L (ref 133–143)
WBC # BLD AUTO: 5.5 K/UL (ref 4.3–11.1)

## 2023-11-13 PROCEDURE — 85027 COMPLETE CBC AUTOMATED: CPT

## 2023-11-13 PROCEDURE — 80048 BASIC METABOLIC PNL TOTAL CA: CPT

## 2023-11-13 RX ORDER — SOLIFENACIN SUCCINATE 5 MG/1
TABLET, FILM COATED ORAL
Qty: 90 TABLET | Refills: 0 | OUTPATIENT
Start: 2023-11-13

## 2023-11-13 RX ORDER — FEXOFENADINE HCL 180 MG/1
180 TABLET ORAL DAILY PRN
Qty: 90 TABLET | Refills: 3 | OUTPATIENT
Start: 2023-11-13

## 2023-11-13 RX ORDER — LOSARTAN POTASSIUM AND HYDROCHLOROTHIAZIDE 12.5; 1 MG/1; MG/1
1 TABLET ORAL NIGHTLY
Qty: 90 TABLET | Refills: 1 | OUTPATIENT
Start: 2023-11-13

## 2023-11-13 ASSESSMENT — PAIN SCALES - GENERAL: PAINLEVEL_OUTOF10: 0

## 2023-11-13 NOTE — PERIOP NOTE
Patient verified name and     Order for consent NOT found in EHR; patient verified. Type 3 surgery, walk in assessment complete. Labs per surgeon: no orders received in EHR at time of assessment. Labs per anesthesia protocol: CBC, BMP; results pending. T&S to be collected dos. EKG: not needed per anesthesia protocol. EKG (23), [facility] note with clearance (23), [facility] note with clearance (8/3/23), and PCP note (23) available in EHR for reference. Cardiac clearance from Dr. Gerardo Garcia reads:   Preoperative cardiovascular examination  - The patient is not having ACS  - RCRI equals 1: 0.9% rate of MACE  - According to the [facility] guidelines, if the estimated perioperative risk of MACE is low (<1%), no further testing is recommended prior to noncardiac surgery  - This patient has an estimated perioperative risk of MACE that is low risk; thus, no further cardiac testing is recommended prior to noncardiac surgery     Pulmonary clearance from Barrie Staley NP reads: \"There is no absolute contraindication for surgery. [facility] approved surgical skin cleanser and instructions given per hospital policy. Patient provided with and instructed on educational handouts including Guide to Surgery, Pain Management, Hand Hygiene, Blood Transfusion Education, and [facility] Brochure. Patient answered medical/surgical history questions at their best of ability. All prior to admission medications documented in [facility]. Original medication prescription bottles NOT visualized during patient appointment. Patient instructed to hold all vitamins 7 days prior to surgery and NSAIDS 5 days prior to surgery, patient verbalized understanding. Patient teach back successful and patient demonstrates knowledge of instructions.

## 2023-11-13 NOTE — PERIOP NOTE
PLEASE CONTINUE TAKING ALL PRESCRIPTION MEDICATIONS UP TO THE DAY OF SURGERY UNLESS OTHERWISE DIRECTED BELOW. You may take Tylenol, allergy,  and/or indigestion medications. TAKE ONLY THESE MEDICATIONS ON THE DAY OF SURGERY ON 11/29/23     Albuterol Nebulizer or Inhaler if needed             DISCONTINUE all vitamins, herbals, and supplements 7 days prior to surgery. DISCONTINUE Non-Steroidal Anti-Inflammatory (NSAIDS) such as Advil, Ibuprofen, Motrin, Aspirin, Naproxen, and Aleve 5 days prior to surgery. Home Medications to Hold- please continue all other medications except these. Comments      On the day before surgery (11/28/23) please take 2 Tylenol in the morning and then again before bed. You may use either regular or extra strength. Bring: Inhaler, Vesicare, Ilana-hex soap, Incentive Spirometer        Please do not bring home medications with you on the day of surgery unless otherwise directed by your nurse. If you are instructed to bring home medications, please give them to your nurse as they will be administered by the nursing staff. If you have any questions, please call [address] [place] [address] [phone]. A copy of this note was provided to the patient for reference.

## 2023-11-13 NOTE — PERIOP NOTE
SURGICAL WEIGHT LOSS Enhanced Recovery After Surgery: diabetic and non-diabetic patients      The evening before surgery on 11/28/23, drink 1 bottles of the Ensure Pre-Surgery drink. Please do not eat or drink after midnight the night before your surgery. Bring your patient handbook with you to the hospital.        Things to Remember:      1. You will be up in a chair the evening of surgery and sipping on clear liquids, once released by your surgeon. 2. Beginning the day of surgery, you will be out of the bed as able, once released by your hospital team. We encourage you to be sitting up in a chair, walking in the almanzar, doing exercises as advised by physical therapy, etc.       3. You will be given scheduled non-narcotic pain medication to help keep your pain under control. You will have stronger pain medication ordered for break through pain. 4. All of these measures are geared toward improving your overall surgical recovery and   decreasing the risk of complications.

## 2023-11-21 RX ORDER — SODIUM CHLORIDE 0.9 % (FLUSH) 0.9 %
5-40 SYRINGE (ML) INJECTION EVERY 12 HOURS SCHEDULED
Status: CANCELLED | OUTPATIENT
Start: 2023-11-21

## 2023-11-21 RX ORDER — SODIUM CHLORIDE 0.9 % (FLUSH) 0.9 %
5-40 SYRINGE (ML) INJECTION PRN
Status: CANCELLED | OUTPATIENT
Start: 2023-11-21

## 2023-11-21 RX ORDER — SODIUM CHLORIDE 9 MG/ML
INJECTION, SOLUTION INTRAVENOUS PRN
Status: CANCELLED | OUTPATIENT
Start: 2023-11-21

## 2023-11-24 RX ORDER — MONTELUKAST SODIUM 10 MG/1
TABLET ORAL
Qty: 90 TABLET | Refills: 0 | Status: SHIPPED | OUTPATIENT
Start: 2023-11-24 | End: 2024-01-22 | Stop reason: SDUPTHER

## 2023-11-27 ENCOUNTER — OFFICE VISIT (OUTPATIENT)
Dept: ENT CLINIC | Age: 53
End: 2023-11-27
Payer: COMMERCIAL

## 2023-11-27 VITALS
HEART RATE: 76 BPM | WEIGHT: 279 LBS | RESPIRATION RATE: 17 BRPM | OXYGEN SATURATION: 98 % | BODY MASS INDEX: 41.32 KG/M2 | HEIGHT: 69 IN

## 2023-11-27 DIAGNOSIS — K22.0 ACHALASIA OF THE CRICOPHARYNGEUS MUSCLE: ICD-10-CM

## 2023-11-27 DIAGNOSIS — J30.9 CHRONIC ALLERGIC RHINITIS: ICD-10-CM

## 2023-11-27 DIAGNOSIS — R09.89 CHRONIC THROAT CLEARING: ICD-10-CM

## 2023-11-27 DIAGNOSIS — R13.12 OROPHARYNGEAL DYSPHAGIA: Primary | ICD-10-CM

## 2023-11-27 DIAGNOSIS — K21.9 LARYNGOPHARYNGEAL REFLUX (LPR): ICD-10-CM

## 2023-11-27 PROCEDURE — 99213 OFFICE O/P EST LOW 20 MIN: CPT | Performed by: STUDENT IN AN ORGANIZED HEALTH CARE EDUCATION/TRAINING PROGRAM

## 2023-11-27 PROCEDURE — 3017F COLORECTAL CA SCREEN DOC REV: CPT | Performed by: STUDENT IN AN ORGANIZED HEALTH CARE EDUCATION/TRAINING PROGRAM

## 2023-11-27 PROCEDURE — G8427 DOCREV CUR MEDS BY ELIG CLIN: HCPCS | Performed by: STUDENT IN AN ORGANIZED HEALTH CARE EDUCATION/TRAINING PROGRAM

## 2023-11-27 PROCEDURE — G8484 FLU IMMUNIZE NO ADMIN: HCPCS | Performed by: STUDENT IN AN ORGANIZED HEALTH CARE EDUCATION/TRAINING PROGRAM

## 2023-11-27 PROCEDURE — G8417 CALC BMI ABV UP PARAM F/U: HCPCS | Performed by: STUDENT IN AN ORGANIZED HEALTH CARE EDUCATION/TRAINING PROGRAM

## 2023-11-27 PROCEDURE — 1036F TOBACCO NON-USER: CPT | Performed by: STUDENT IN AN ORGANIZED HEALTH CARE EDUCATION/TRAINING PROGRAM

## 2023-11-27 ASSESSMENT — ENCOUNTER SYMPTOMS
SHORTNESS OF BREATH: 0
EYE DISCHARGE: 0
CONSTIPATION: 0
APNEA: 0
EYE PAIN: 0
DIARRHEA: 0
NAUSEA: 0
EYE ITCHING: 0
COUGH: 0
STRIDOR: 0
CHOKING: 0
WHEEZING: 0
SINUS PRESSURE: 0
SINUS PAIN: 0
FACIAL SWELLING: 0

## 2023-11-27 NOTE — PROGRESS NOTES
HPI:  Frank Siddiqi is a 47 y.o. female seen Established   Chief Complaint   Patient presents with    Follow-up     Patient presents today for 3 MO LPR recheck . Patient has noticed improvement but has has some pill dysphagia occasionally . 64-year-old female seen for a follow-up evaluation having history of dysphagia status post esophageal dilation and UES Botox injection for achalasia treatment on 10/20/2023. Symptomatically she has had a very mild improvement but still with some ongoing pill dysphagia. She is scheduled for an upcoming gastric bypass surgery next month. She is hopeful that with weight loss her GERD will continue to move in a favorable direction. She is struggled with chronic GERD long-term which has been more so controlled with her escalation of Protonix to 40 mg over the last couple months. She also struggles with long-term chronic allergic rhinitis with chronic postnasal drip and throat clearing. This has been less controlled recently. She continues with daily intranasal corticosteroid spray and oral antihistamines. Past Medical History, Past Surgical History, Family history, Social History, and Medications were all reviewed with the patient today and updated as necessary.      No Known Allergies    Patient Active Problem List   Diagnosis    Severe obesity ([address])    Urge incontinence    Vaginal dryness    Major depressive disorder, single episode, unspecified    History of tobacco use    Multiple lung nodules on CT    Insomnia    Hyperlipidemia    Recurrent major depressive disorder, in full remission ([address])    History of gastroesophageal reflux (GERD)    GERD (gastroesophageal reflux disease)    Hypertension    Achalasia of the cricopharyngeus muscle    Oropharyngeal dysphagia    Morbid obesity (HCC)       Current Outpatient Medications   Medication Sig    montelukast (SINGULAIR) 10 MG tablet TAKE 1 TABLET BY MOUTH AT NIGHT  FOR ALLERGIES    solifenacin (VESICARE) 5 MG tablet

## 2023-11-29 ENCOUNTER — HOSPITAL ENCOUNTER (INPATIENT)
Age: 53
LOS: 1 days | Discharge: HOME | DRG: 621 | End: 2023-11-30
Attending: SURGERY | Admitting: SURGERY
Payer: COMMERCIAL

## 2023-11-29 ENCOUNTER — ANESTHESIA EVENT (OUTPATIENT)
Dept: SURGERY | Age: 53
DRG: 621 | End: 2023-11-29
Payer: COMMERCIAL

## 2023-11-29 ENCOUNTER — ANESTHESIA (OUTPATIENT)
Dept: SURGERY | Age: 53
DRG: 621 | End: 2023-11-29
Payer: COMMERCIAL

## 2023-11-29 LAB
ABO + RH BLD: NORMAL
BLOOD GROUP ANTIBODIES SERPL: NORMAL
GLUCOSE BLD STRIP.AUTO-MCNC: 108 MG/DL (ref 65–100)
GLUCOSE BLD STRIP.AUTO-MCNC: 140 MG/DL (ref 65–100)
SERVICE CMNT-IMP: ABNORMAL
SERVICE CMNT-IMP: ABNORMAL
SPECIMEN EXP DATE BLD: NORMAL

## 2023-11-29 PROCEDURE — 2500000003 HC RX 250 WO HCPCS: Performed by: NURSE ANESTHETIST, CERTIFIED REGISTERED

## 2023-11-29 PROCEDURE — 2500000003 HC RX 250 WO HCPCS: Performed by: PHYSICIAN ASSISTANT

## 2023-11-29 PROCEDURE — 97530 THERAPEUTIC ACTIVITIES: CPT

## 2023-11-29 PROCEDURE — 3700000000 HC ANESTHESIA ATTENDED CARE: Performed by: SURGERY

## 2023-11-29 PROCEDURE — 0DJ08ZZ INSPECTION OF UPPER INTESTINAL TRACT, VIA NATURAL OR ARTIFICIAL OPENING ENDOSCOPIC: ICD-10-PCS | Performed by: SURGERY

## 2023-11-29 PROCEDURE — 6360000002 HC RX W HCPCS: Performed by: PHYSICIAN ASSISTANT

## 2023-11-29 PROCEDURE — S2900 ROBOTIC SURGICAL SYSTEM: HCPCS | Performed by: SURGERY

## 2023-11-29 PROCEDURE — 86900 BLOOD TYPING SEROLOGIC ABO: CPT

## 2023-11-29 PROCEDURE — 86901 BLOOD TYPING SEROLOGIC RH(D): CPT

## 2023-11-29 PROCEDURE — 6360000002 HC RX W HCPCS: Performed by: ANESTHESIOLOGY

## 2023-11-29 PROCEDURE — 43644 LAP GASTRIC BYPASS/ROUX-EN-Y: CPT | Performed by: SURGERY

## 2023-11-29 PROCEDURE — 8E0W4CZ ROBOTIC ASSISTED PROCEDURE OF TRUNK REGION, PERCUTANEOUS ENDOSCOPIC APPROACH: ICD-10-PCS | Performed by: SURGERY

## 2023-11-29 PROCEDURE — 2709999900 HC NON-CHARGEABLE SUPPLY: Performed by: SURGERY

## 2023-11-29 PROCEDURE — 0D164ZA BYPASS STOMACH TO JEJUNUM, PERCUTANEOUS ENDOSCOPIC APPROACH: ICD-10-PCS | Performed by: SURGERY

## 2023-11-29 PROCEDURE — 94760 N-INVAS EAR/PLS OXIMETRY 1: CPT

## 2023-11-29 PROCEDURE — 82962 GLUCOSE BLOOD TEST: CPT

## 2023-11-29 PROCEDURE — 6370000000 HC RX 637 (ALT 250 FOR IP): Performed by: ANESTHESIOLOGY

## 2023-11-29 PROCEDURE — 86850 RBC ANTIBODY SCREEN: CPT

## 2023-11-29 PROCEDURE — 97161 PT EVAL LOW COMPLEX 20 MIN: CPT

## 2023-11-29 PROCEDURE — 2720000010 HC SURG SUPPLY STERILE: Performed by: SURGERY

## 2023-11-29 PROCEDURE — 3700000001 HC ADD 15 MINUTES (ANESTHESIA): Performed by: SURGERY

## 2023-11-29 PROCEDURE — A4216 STERILE WATER/SALINE, 10 ML: HCPCS | Performed by: PHYSICIAN ASSISTANT

## 2023-11-29 PROCEDURE — 3600000019 HC SURGERY ROBOT ADDTL 15MIN: Performed by: SURGERY

## 2023-11-29 PROCEDURE — 3600000009 HC SURGERY ROBOT BASE: Performed by: SURGERY

## 2023-11-29 PROCEDURE — 2700000000 HC OXYGEN THERAPY PER DAY

## 2023-11-29 PROCEDURE — 6360000002 HC RX W HCPCS: Performed by: NURSE ANESTHETIST, CERTIFIED REGISTERED

## 2023-11-29 PROCEDURE — 2580000003 HC RX 258: Performed by: PHYSICIAN ASSISTANT

## 2023-11-29 PROCEDURE — 6370000000 HC RX 637 (ALT 250 FOR IP): Performed by: NURSE PRACTITIONER

## 2023-11-29 PROCEDURE — 1100000000 HC RM PRIVATE

## 2023-11-29 PROCEDURE — C9113 INJ PANTOPRAZOLE SODIUM, VIA: HCPCS | Performed by: PHYSICIAN ASSISTANT

## 2023-11-29 PROCEDURE — 7100000001 HC PACU RECOVERY - ADDTL 15 MIN: Performed by: SURGERY

## 2023-11-29 PROCEDURE — 6360000002 HC RX W HCPCS: Performed by: SURGERY

## 2023-11-29 PROCEDURE — 6370000000 HC RX 637 (ALT 250 FOR IP): Performed by: PHYSICIAN ASSISTANT

## 2023-11-29 PROCEDURE — 7100000000 HC PACU RECOVERY - FIRST 15 MIN: Performed by: SURGERY

## 2023-11-29 DEVICE — SEALANT TISS 4 CC FIBRIN VISTASEAL: Type: IMPLANTABLE DEVICE | Site: STOMACH | Status: FUNCTIONAL

## 2023-11-29 RX ORDER — SODIUM CHLORIDE AND POTASSIUM CHLORIDE 150; 900 MG/100ML; MG/100ML
INJECTION, SOLUTION INTRAVENOUS CONTINUOUS
Status: DISCONTINUED | OUTPATIENT
Start: 2023-11-29 | End: 2023-11-30 | Stop reason: HOSPADM

## 2023-11-29 RX ORDER — HYDRALAZINE HYDROCHLORIDE 20 MG/ML
10 INJECTION INTRAMUSCULAR; INTRAVENOUS EVERY 6 HOURS PRN
Status: DISCONTINUED | OUTPATIENT
Start: 2023-11-29 | End: 2023-11-30 | Stop reason: HOSPADM

## 2023-11-29 RX ORDER — APREPITANT 40 MG/1
40 CAPSULE ORAL ONCE
Status: COMPLETED | OUTPATIENT
Start: 2023-11-29 | End: 2023-11-29

## 2023-11-29 RX ORDER — LIDOCAINE HYDROCHLORIDE 10 MG/ML
1 INJECTION, SOLUTION INFILTRATION; PERINEURAL
Status: DISCONTINUED | OUTPATIENT
Start: 2023-11-29 | End: 2023-11-29 | Stop reason: HOSPADM

## 2023-11-29 RX ORDER — DIPHENHYDRAMINE HYDROCHLORIDE 50 MG/ML
25 INJECTION INTRAMUSCULAR; INTRAVENOUS EVERY 6 HOURS PRN
Status: DISCONTINUED | OUTPATIENT
Start: 2023-11-29 | End: 2023-11-30 | Stop reason: HOSPADM

## 2023-11-29 RX ORDER — SODIUM CHLORIDE 9 MG/ML
INJECTION, SOLUTION INTRAVENOUS PRN
Status: DISCONTINUED | OUTPATIENT
Start: 2023-11-29 | End: 2023-11-30 | Stop reason: HOSPADM

## 2023-11-29 RX ORDER — GABAPENTIN 300 MG/1
300 CAPSULE ORAL 3 TIMES DAILY
Status: DISCONTINUED | OUTPATIENT
Start: 2023-11-29 | End: 2023-11-30 | Stop reason: HOSPADM

## 2023-11-29 RX ORDER — HEPARIN SODIUM 5000 [USP'U]/ML
5000 INJECTION, SOLUTION INTRAVENOUS; SUBCUTANEOUS ONCE
Status: COMPLETED | OUTPATIENT
Start: 2023-11-29 | End: 2023-11-29

## 2023-11-29 RX ORDER — LIDOCAINE HYDROCHLORIDE ANHYDROUS AND DEXTROSE MONOHYDRATE 5; 400 G/100ML; MG/100ML
INJECTION, SOLUTION INTRAVENOUS CONTINUOUS PRN
Status: DISCONTINUED | OUTPATIENT
Start: 2023-11-29 | End: 2023-11-29 | Stop reason: SDUPTHER

## 2023-11-29 RX ORDER — SCOLOPAMINE TRANSDERMAL SYSTEM 1 MG/1
1 PATCH, EXTENDED RELEASE TRANSDERMAL
Status: DISCONTINUED | OUTPATIENT
Start: 2023-11-29 | End: 2023-11-30 | Stop reason: HOSPADM

## 2023-11-29 RX ORDER — SODIUM CHLORIDE 9 MG/ML
INJECTION, SOLUTION INTRAVENOUS PRN
Status: DISCONTINUED | OUTPATIENT
Start: 2023-11-29 | End: 2023-11-29 | Stop reason: HOSPADM

## 2023-11-29 RX ORDER — ONDANSETRON 2 MG/ML
4 INJECTION INTRAMUSCULAR; INTRAVENOUS
Status: DISCONTINUED | OUTPATIENT
Start: 2023-11-29 | End: 2023-11-29 | Stop reason: HOSPADM

## 2023-11-29 RX ORDER — PROCHLORPERAZINE EDISYLATE 5 MG/ML
5 INJECTION INTRAMUSCULAR; INTRAVENOUS
Status: COMPLETED | OUTPATIENT
Start: 2023-11-29 | End: 2023-11-29

## 2023-11-29 RX ORDER — ONDANSETRON 2 MG/ML
4 INJECTION INTRAMUSCULAR; INTRAVENOUS ONCE
Status: DISCONTINUED | OUTPATIENT
Start: 2023-11-29 | End: 2023-11-29 | Stop reason: HOSPADM

## 2023-11-29 RX ORDER — SODIUM CHLORIDE, SODIUM LACTATE, POTASSIUM CHLORIDE, CALCIUM CHLORIDE 600; 310; 30; 20 MG/100ML; MG/100ML; MG/100ML; MG/100ML
INJECTION, SOLUTION INTRAVENOUS CONTINUOUS
Status: DISCONTINUED | OUTPATIENT
Start: 2023-11-29 | End: 2023-11-29 | Stop reason: HOSPADM

## 2023-11-29 RX ORDER — DIPHENHYDRAMINE HCL 25 MG
25 CAPSULE ORAL EVERY 6 HOURS PRN
Status: DISCONTINUED | OUTPATIENT
Start: 2023-11-29 | End: 2023-11-30 | Stop reason: HOSPADM

## 2023-11-29 RX ORDER — SIMETHICONE 80 MG
80 TABLET,CHEWABLE ORAL EVERY 6 HOURS PRN
Status: DISCONTINUED | OUTPATIENT
Start: 2023-11-29 | End: 2023-11-30 | Stop reason: HOSPADM

## 2023-11-29 RX ORDER — MIDAZOLAM HYDROCHLORIDE 1 MG/ML
INJECTION INTRAMUSCULAR; INTRAVENOUS PRN
Status: DISCONTINUED | OUTPATIENT
Start: 2023-11-29 | End: 2023-11-29 | Stop reason: SDUPTHER

## 2023-11-29 RX ORDER — NEOSTIGMINE METHYLSULFATE 1 MG/ML
INJECTION, SOLUTION INTRAVENOUS PRN
Status: DISCONTINUED | OUTPATIENT
Start: 2023-11-29 | End: 2023-11-29 | Stop reason: SDUPTHER

## 2023-11-29 RX ORDER — MIDAZOLAM HYDROCHLORIDE 2 MG/2ML
2 INJECTION, SOLUTION INTRAMUSCULAR; INTRAVENOUS
Status: DISCONTINUED | OUTPATIENT
Start: 2023-11-29 | End: 2023-11-29 | Stop reason: HOSPADM

## 2023-11-29 RX ORDER — SODIUM CHLORIDE 0.9 % (FLUSH) 0.9 %
5-40 SYRINGE (ML) INJECTION EVERY 12 HOURS SCHEDULED
Status: DISCONTINUED | OUTPATIENT
Start: 2023-11-29 | End: 2023-11-29 | Stop reason: HOSPADM

## 2023-11-29 RX ORDER — HYDROMORPHONE HYDROCHLORIDE 1 MG/ML
0.5 INJECTION, SOLUTION INTRAMUSCULAR; INTRAVENOUS; SUBCUTANEOUS
Status: DISCONTINUED | OUTPATIENT
Start: 2023-11-29 | End: 2023-11-30 | Stop reason: HOSPADM

## 2023-11-29 RX ORDER — ACETAMINOPHEN 325 MG/1
650 TABLET ORAL EVERY 6 HOURS
Status: DISCONTINUED | OUTPATIENT
Start: 2023-11-29 | End: 2023-11-30 | Stop reason: HOSPADM

## 2023-11-29 RX ORDER — LIDOCAINE HYDROCHLORIDE 20 MG/ML
INJECTION, SOLUTION EPIDURAL; INFILTRATION; INTRACAUDAL; PERINEURAL PRN
Status: DISCONTINUED | OUTPATIENT
Start: 2023-11-29 | End: 2023-11-29 | Stop reason: SDUPTHER

## 2023-11-29 RX ORDER — TROSPIUM CHLORIDE 20 MG/1
20 TABLET, FILM COATED ORAL
Status: DISCONTINUED | OUTPATIENT
Start: 2023-11-29 | End: 2023-11-30 | Stop reason: HOSPADM

## 2023-11-29 RX ORDER — LAMOTRIGINE 100 MG/1
100 TABLET ORAL EVERY EVENING
Status: DISCONTINUED | OUTPATIENT
Start: 2023-11-29 | End: 2023-11-30 | Stop reason: HOSPADM

## 2023-11-29 RX ORDER — HEPARIN SODIUM 5000 [USP'U]/ML
5000 INJECTION, SOLUTION INTRAVENOUS; SUBCUTANEOUS EVERY 8 HOURS SCHEDULED
Status: DISCONTINUED | OUTPATIENT
Start: 2023-11-29 | End: 2023-11-30 | Stop reason: HOSPADM

## 2023-11-29 RX ORDER — SODIUM CHLORIDE 0.9 % (FLUSH) 0.9 %
5-40 SYRINGE (ML) INJECTION PRN
Status: DISCONTINUED | OUTPATIENT
Start: 2023-11-29 | End: 2023-11-29 | Stop reason: HOSPADM

## 2023-11-29 RX ORDER — BUPIVACAINE HYDROCHLORIDE 5 MG/ML
INJECTION, SOLUTION EPIDURAL; INTRACAUDAL PRN
Status: DISCONTINUED | OUTPATIENT
Start: 2023-11-29 | End: 2023-11-29 | Stop reason: ALTCHOICE

## 2023-11-29 RX ORDER — OXYCODONE HYDROCHLORIDE 5 MG/1
10 TABLET ORAL PRN
Status: COMPLETED | OUTPATIENT
Start: 2023-11-29 | End: 2023-11-29

## 2023-11-29 RX ORDER — PROCHLORPERAZINE EDISYLATE 5 MG/ML
10 INJECTION INTRAMUSCULAR; INTRAVENOUS EVERY 6 HOURS PRN
Status: DISCONTINUED | OUTPATIENT
Start: 2023-11-29 | End: 2023-11-30 | Stop reason: HOSPADM

## 2023-11-29 RX ORDER — OXYCODONE HYDROCHLORIDE 5 MG/1
5 TABLET ORAL PRN
Status: COMPLETED | OUTPATIENT
Start: 2023-11-29 | End: 2023-11-29

## 2023-11-29 RX ORDER — METOCLOPRAMIDE HYDROCHLORIDE 5 MG/ML
10 INJECTION INTRAMUSCULAR; INTRAVENOUS ONCE
Status: COMPLETED | OUTPATIENT
Start: 2023-11-29 | End: 2023-11-29

## 2023-11-29 RX ORDER — SODIUM CHLORIDE, SODIUM LACTATE, POTASSIUM CHLORIDE, CALCIUM CHLORIDE 600; 310; 30; 20 MG/100ML; MG/100ML; MG/100ML; MG/100ML
INJECTION, SOLUTION INTRAVENOUS CONTINUOUS
Status: DISCONTINUED | OUTPATIENT
Start: 2023-11-29 | End: 2023-11-30 | Stop reason: HOSPADM

## 2023-11-29 RX ORDER — TRAZODONE HYDROCHLORIDE 50 MG/1
100 TABLET ORAL NIGHTLY
Status: DISCONTINUED | OUTPATIENT
Start: 2023-11-29 | End: 2023-11-30 | Stop reason: HOSPADM

## 2023-11-29 RX ORDER — SUCRALFATE 1 G/1
1 TABLET ORAL EVERY 6 HOURS SCHEDULED
Status: DISCONTINUED | OUTPATIENT
Start: 2023-11-29 | End: 2023-11-30 | Stop reason: HOSPADM

## 2023-11-29 RX ORDER — DIPHENHYDRAMINE HYDROCHLORIDE 50 MG/ML
12.5 INJECTION INTRAMUSCULAR; INTRAVENOUS
Status: DISCONTINUED | OUTPATIENT
Start: 2023-11-29 | End: 2023-11-29 | Stop reason: HOSPADM

## 2023-11-29 RX ORDER — GLYCOPYRROLATE 0.2 MG/ML
INJECTION INTRAMUSCULAR; INTRAVENOUS PRN
Status: DISCONTINUED | OUTPATIENT
Start: 2023-11-29 | End: 2023-11-29 | Stop reason: SDUPTHER

## 2023-11-29 RX ORDER — KETAMINE HYDROCHLORIDE 50 MG/ML
INJECTION, SOLUTION, CONCENTRATE INTRAMUSCULAR; INTRAVENOUS PRN
Status: DISCONTINUED | OUTPATIENT
Start: 2023-11-29 | End: 2023-11-29 | Stop reason: SDUPTHER

## 2023-11-29 RX ORDER — MONTELUKAST SODIUM 10 MG/1
10 TABLET ORAL NIGHTLY
Status: DISCONTINUED | OUTPATIENT
Start: 2023-11-29 | End: 2023-11-30 | Stop reason: HOSPADM

## 2023-11-29 RX ORDER — HYDROMORPHONE HYDROCHLORIDE 1 MG/ML
0.5 INJECTION, SOLUTION INTRAMUSCULAR; INTRAVENOUS; SUBCUTANEOUS EVERY 5 MIN PRN
Status: DISCONTINUED | OUTPATIENT
Start: 2023-11-29 | End: 2023-11-29 | Stop reason: HOSPADM

## 2023-11-29 RX ORDER — ROCURONIUM BROMIDE 10 MG/ML
INJECTION, SOLUTION INTRAVENOUS PRN
Status: DISCONTINUED | OUTPATIENT
Start: 2023-11-29 | End: 2023-11-29 | Stop reason: SDUPTHER

## 2023-11-29 RX ORDER — SODIUM CHLORIDE 0.9 % (FLUSH) 0.9 %
5-40 SYRINGE (ML) INJECTION EVERY 12 HOURS SCHEDULED
Status: DISCONTINUED | OUTPATIENT
Start: 2023-11-29 | End: 2023-11-30 | Stop reason: HOSPADM

## 2023-11-29 RX ORDER — ONDANSETRON 2 MG/ML
4 INJECTION INTRAMUSCULAR; INTRAVENOUS EVERY 6 HOURS PRN
Status: DISCONTINUED | OUTPATIENT
Start: 2023-11-29 | End: 2023-11-30 | Stop reason: HOSPADM

## 2023-11-29 RX ORDER — ACETAMINOPHEN 500 MG
1000 TABLET ORAL ONCE
Status: COMPLETED | OUTPATIENT
Start: 2023-11-29 | End: 2023-11-29

## 2023-11-29 RX ORDER — KETOROLAC TROMETHAMINE 30 MG/ML
INJECTION, SOLUTION INTRAMUSCULAR; INTRAVENOUS PRN
Status: DISCONTINUED | OUTPATIENT
Start: 2023-11-29 | End: 2023-11-29 | Stop reason: SDUPTHER

## 2023-11-29 RX ORDER — SUCCINYLCHOLINE CHLORIDE 20 MG/ML
INJECTION INTRAMUSCULAR; INTRAVENOUS PRN
Status: DISCONTINUED | OUTPATIENT
Start: 2023-11-29 | End: 2023-11-29 | Stop reason: SDUPTHER

## 2023-11-29 RX ORDER — PROPOFOL 10 MG/ML
INJECTION, EMULSION INTRAVENOUS PRN
Status: DISCONTINUED | OUTPATIENT
Start: 2023-11-29 | End: 2023-11-29 | Stop reason: SDUPTHER

## 2023-11-29 RX ORDER — OXYCODONE HYDROCHLORIDE 5 MG/1
5 TABLET ORAL EVERY 4 HOURS PRN
Status: DISCONTINUED | OUTPATIENT
Start: 2023-11-29 | End: 2023-11-30 | Stop reason: HOSPADM

## 2023-11-29 RX ORDER — FENTANYL CITRATE 50 UG/ML
INJECTION, SOLUTION INTRAMUSCULAR; INTRAVENOUS PRN
Status: DISCONTINUED | OUTPATIENT
Start: 2023-11-29 | End: 2023-11-29 | Stop reason: SDUPTHER

## 2023-11-29 RX ORDER — CETIRIZINE HYDROCHLORIDE 10 MG/1
10 TABLET ORAL NIGHTLY
Status: DISCONTINUED | OUTPATIENT
Start: 2023-11-29 | End: 2023-11-30 | Stop reason: HOSPADM

## 2023-11-29 RX ORDER — EPHEDRINE SULFATE/0.9% NACL/PF 50 MG/5 ML
SYRINGE (ML) INTRAVENOUS PRN
Status: DISCONTINUED | OUTPATIENT
Start: 2023-11-29 | End: 2023-11-29 | Stop reason: SDUPTHER

## 2023-11-29 RX ORDER — LIDOCAINE HYDROCHLORIDE ANHYDROUS AND DEXTROSE MONOHYDRATE 5; 400 G/100ML; MG/100ML
1 INJECTION, SOLUTION INTRAVENOUS CONTINUOUS
Status: DISPENSED | OUTPATIENT
Start: 2023-11-29 | End: 2023-11-30

## 2023-11-29 RX ORDER — SODIUM CHLORIDE 0.9 % (FLUSH) 0.9 %
5-40 SYRINGE (ML) INJECTION PRN
Status: DISCONTINUED | OUTPATIENT
Start: 2023-11-29 | End: 2023-11-30 | Stop reason: HOSPADM

## 2023-11-29 RX ORDER — LIDOCAINE HYDROCHLORIDE ANHYDROUS AND DEXTROSE MONOHYDRATE 5; 400 G/100ML; MG/100ML
INJECTION, SOLUTION INTRAVENOUS CONTINUOUS PRN
Status: DISCONTINUED | OUTPATIENT
Start: 2023-11-29 | End: 2023-11-29

## 2023-11-29 RX ORDER — ONDANSETRON 2 MG/ML
INJECTION INTRAMUSCULAR; INTRAVENOUS PRN
Status: DISCONTINUED | OUTPATIENT
Start: 2023-11-29 | End: 2023-11-29 | Stop reason: SDUPTHER

## 2023-11-29 RX ORDER — FLUOXETINE HYDROCHLORIDE 20 MG/1
80 CAPSULE ORAL NIGHTLY
Status: DISCONTINUED | OUTPATIENT
Start: 2023-11-29 | End: 2023-11-30 | Stop reason: HOSPADM

## 2023-11-29 RX ORDER — KETOROLAC TROMETHAMINE 30 MG/ML
30 INJECTION, SOLUTION INTRAMUSCULAR; INTRAVENOUS EVERY 6 HOURS
Status: COMPLETED | OUTPATIENT
Start: 2023-11-29 | End: 2023-11-30

## 2023-11-29 RX ADMIN — LIDOCAINE HYDROCHLORIDE 1 MG/KG/HR: 4 INJECTION, SOLUTION INTRAVENOUS at 13:00

## 2023-11-29 RX ADMIN — KETAMINE HYDROCHLORIDE 30 MG: 50 INJECTION, SOLUTION INTRAMUSCULAR; INTRAVENOUS at 08:44

## 2023-11-29 RX ADMIN — GLYCOPYRROLATE 0.5 MG: 0.2 INJECTION INTRAMUSCULAR; INTRAVENOUS at 10:10

## 2023-11-29 RX ADMIN — OXYCODONE 10 MG: 5 TABLET ORAL at 10:51

## 2023-11-29 RX ADMIN — SIMETHICONE 80 MG: 80 TABLET, CHEWABLE ORAL at 23:10

## 2023-11-29 RX ADMIN — FENTANYL CITRATE 25 MCG: 50 INJECTION, SOLUTION INTRAMUSCULAR; INTRAVENOUS at 10:32

## 2023-11-29 RX ADMIN — SODIUM CHLORIDE 40 MG: 9 INJECTION INTRAMUSCULAR; INTRAVENOUS; SUBCUTANEOUS at 15:11

## 2023-11-29 RX ADMIN — FENTANYL CITRATE 100 MCG: 50 INJECTION, SOLUTION INTRAMUSCULAR; INTRAVENOUS at 08:35

## 2023-11-29 RX ADMIN — APREPITANT 40 MG: 40 CAPSULE ORAL at 06:55

## 2023-11-29 RX ADMIN — METOCLOPRAMIDE HYDROCHLORIDE 10 MG: 5 INJECTION INTRAMUSCULAR; INTRAVENOUS at 06:57

## 2023-11-29 RX ADMIN — GABAPENTIN 300 MG: 300 CAPSULE ORAL at 15:11

## 2023-11-29 RX ADMIN — FENTANYL CITRATE 25 MCG: 50 INJECTION, SOLUTION INTRAMUSCULAR; INTRAVENOUS at 09:55

## 2023-11-29 RX ADMIN — LIDOCAINE HYDROCHLORIDE 1.5 MG/KG/HR: 4 INJECTION, SOLUTION INTRAVENOUS at 08:42

## 2023-11-29 RX ADMIN — GLYCOPYRROLATE 0.2 MG: 0.2 INJECTION INTRAMUSCULAR; INTRAVENOUS at 10:12

## 2023-11-29 RX ADMIN — HYDROMORPHONE HYDROCHLORIDE 0.5 MG: 1 INJECTION, SOLUTION INTRAMUSCULAR; INTRAVENOUS; SUBCUTANEOUS at 10:51

## 2023-11-29 RX ADMIN — ACETAMINOPHEN 650 MG: 325 TABLET, FILM COATED ORAL at 23:10

## 2023-11-29 RX ADMIN — ROCURONIUM BROMIDE 30 MG: 10 INJECTION, SOLUTION INTRAVENOUS at 08:42

## 2023-11-29 RX ADMIN — TRAZODONE HYDROCHLORIDE 100 MG: 50 TABLET ORAL at 23:10

## 2023-11-29 RX ADMIN — PHENYLEPHRINE HYDROCHLORIDE 50 MCG: 0.1 INJECTION, SOLUTION INTRAVENOUS at 09:11

## 2023-11-29 RX ADMIN — PROPOFOL 200 MG: 10 INJECTION, EMULSION INTRAVENOUS at 08:35

## 2023-11-29 RX ADMIN — MONTELUKAST 10 MG: 10 TABLET, FILM COATED ORAL at 23:10

## 2023-11-29 RX ADMIN — LIDOCAINE HYDROCHLORIDE 60 MG: 20 INJECTION, SOLUTION EPIDURAL; INFILTRATION; INTRACAUDAL; PERINEURAL at 08:35

## 2023-11-29 RX ADMIN — HEPARIN SODIUM 5000 UNITS: 5000 INJECTION INTRAVENOUS; SUBCUTANEOUS at 06:57

## 2023-11-29 RX ADMIN — Medication 160 MG: at 08:35

## 2023-11-29 RX ADMIN — FLUOXETINE 80 MG: 20 CAPSULE ORAL at 23:09

## 2023-11-29 RX ADMIN — KETOROLAC TROMETHAMINE 30 MG: 30 INJECTION, SOLUTION INTRAMUSCULAR at 23:10

## 2023-11-29 RX ADMIN — SODIUM CHLORIDE, SODIUM LACTATE, POTASSIUM CHLORIDE, AND CALCIUM CHLORIDE: 600; 310; 30; 20 INJECTION, SOLUTION INTRAVENOUS at 09:16

## 2023-11-29 RX ADMIN — ROCURONIUM BROMIDE 10 MG: 10 INJECTION, SOLUTION INTRAVENOUS at 09:03

## 2023-11-29 RX ADMIN — OXYCODONE 5 MG: 5 TABLET ORAL at 13:02

## 2023-11-29 RX ADMIN — KETAMINE HYDROCHLORIDE 10 MG: 50 INJECTION, SOLUTION INTRAMUSCULAR; INTRAVENOUS at 09:20

## 2023-11-29 RX ADMIN — Medication 3 MG: at 10:10

## 2023-11-29 RX ADMIN — KETOROLAC TROMETHAMINE 30 MG: 30 INJECTION, SOLUTION INTRAMUSCULAR at 17:42

## 2023-11-29 RX ADMIN — Medication 10 MG: at 08:49

## 2023-11-29 RX ADMIN — THIAMINE HYDROCHLORIDE: 100 INJECTION, SOLUTION INTRAMUSCULAR; INTRAVENOUS at 13:47

## 2023-11-29 RX ADMIN — ACETAMINOPHEN 650 MG: 325 TABLET, FILM COATED ORAL at 17:42

## 2023-11-29 RX ADMIN — ONDANSETRON 4 MG: 2 INJECTION INTRAMUSCULAR; INTRAVENOUS at 09:47

## 2023-11-29 RX ADMIN — LAMOTRIGINE 100 MG: 100 TABLET ORAL at 23:10

## 2023-11-29 RX ADMIN — HEPARIN SODIUM 5000 UNITS: 5000 INJECTION INTRAVENOUS; SUBCUTANEOUS at 21:48

## 2023-11-29 RX ADMIN — HYDROMORPHONE HYDROCHLORIDE 0.5 MG: 1 INJECTION, SOLUTION INTRAMUSCULAR; INTRAVENOUS; SUBCUTANEOUS at 10:56

## 2023-11-29 RX ADMIN — PHENYLEPHRINE HYDROCHLORIDE 100 MCG: 0.1 INJECTION, SOLUTION INTRAVENOUS at 08:50

## 2023-11-29 RX ADMIN — TROSPIUM CHLORIDE 20 MG: 20 TABLET, FILM COATED ORAL at 21:47

## 2023-11-29 RX ADMIN — GABAPENTIN 300 MG: 300 CAPSULE ORAL at 21:47

## 2023-11-29 RX ADMIN — PHENYLEPHRINE HYDROCHLORIDE 50 MCG: 0.1 INJECTION, SOLUTION INTRAVENOUS at 09:28

## 2023-11-29 RX ADMIN — Medication 10 MG: at 08:47

## 2023-11-29 RX ADMIN — MIDAZOLAM 2 MG: 1 INJECTION INTRAMUSCULAR; INTRAVENOUS at 08:27

## 2023-11-29 RX ADMIN — KETOROLAC TROMETHAMINE 30 MG: 30 INJECTION, SOLUTION INTRAMUSCULAR at 09:47

## 2023-11-29 RX ADMIN — ROCURONIUM BROMIDE 10 MG: 10 INJECTION, SOLUTION INTRAVENOUS at 09:30

## 2023-11-29 RX ADMIN — SUCRALFATE 1 G: 1 TABLET ORAL at 23:10

## 2023-11-29 RX ADMIN — SODIUM CHLORIDE, SODIUM LACTATE, POTASSIUM CHLORIDE, AND CALCIUM CHLORIDE: 600; 310; 30; 20 INJECTION, SOLUTION INTRAVENOUS at 06:50

## 2023-11-29 RX ADMIN — PHENYLEPHRINE HYDROCHLORIDE 50 MCG: 0.1 INJECTION, SOLUTION INTRAVENOUS at 09:58

## 2023-11-29 RX ADMIN — PROCHLORPERAZINE EDISYLATE 5 MG: 5 INJECTION INTRAMUSCULAR; INTRAVENOUS at 11:11

## 2023-11-29 RX ADMIN — Medication 10 MG: at 09:37

## 2023-11-29 RX ADMIN — PHENYLEPHRINE HYDROCHLORIDE 50 MCG: 0.1 INJECTION, SOLUTION INTRAVENOUS at 09:42

## 2023-11-29 RX ADMIN — Medication 3000 MG: at 08:29

## 2023-11-29 RX ADMIN — SUCRALFATE 1 G: 1 TABLET ORAL at 17:42

## 2023-11-29 RX ADMIN — SODIUM CHLORIDE, SODIUM LACTATE, POTASSIUM CHLORIDE, AND CALCIUM CHLORIDE: 600; 310; 30; 20 INJECTION, SOLUTION INTRAVENOUS at 08:21

## 2023-11-29 RX ADMIN — HYDROMORPHONE HYDROCHLORIDE 0.5 MG: 1 INJECTION, SOLUTION INTRAMUSCULAR; INTRAVENOUS; SUBCUTANEOUS at 11:11

## 2023-11-29 RX ADMIN — Medication 2 MG: at 10:12

## 2023-11-29 RX ADMIN — CETIRIZINE HYDROCHLORIDE 10 MG: 10 TABLET, FILM COATED ORAL at 23:10

## 2023-11-29 RX ADMIN — ACETAMINOPHEN 1000 MG: 500 TABLET, FILM COATED ORAL at 07:33

## 2023-11-29 ASSESSMENT — PAIN DESCRIPTION - ORIENTATION: ORIENTATION: OTHER (COMMENT)

## 2023-11-29 ASSESSMENT — PAIN - FUNCTIONAL ASSESSMENT: PAIN_FUNCTIONAL_ASSESSMENT: 0-10

## 2023-11-29 ASSESSMENT — PAIN DESCRIPTION - LOCATION
LOCATION: ABDOMEN

## 2023-11-29 ASSESSMENT — PAIN SCALES - GENERAL
PAINLEVEL_OUTOF10: 3
PAINLEVEL_OUTOF10: 6
PAINLEVEL_OUTOF10: 8
PAINLEVEL_OUTOF10: 6

## 2023-11-29 ASSESSMENT — PAIN DESCRIPTION - DESCRIPTORS: DESCRIPTORS: SHARP

## 2023-11-29 NOTE — ANESTHESIA POSTPROCEDURE EVALUATION
Department of Anesthesiology  Postprocedure Note    Patient: Nori Russell  MRN: [id number]  YOB: 1970  Date of evaluation: 11/29/2023      Procedure Summary     Date: 11/29/23 Room [facility]  / [facility]    Anesthesia Start: 1929 Anesthesia Stop: 5937    Procedures:       ERAS/ GASTRIC BYPASS ALICE-EN-Y LAPAROSCOPIC ROBOTIC (Abdomen)      EGD ESOPHAGOGASTRODUODENOSCOPY (Esophagus) Diagnosis:       Morbid obesity ([address])      (Morbid obesity ([address]) [E66.01])    Surgeons: Yesy Lees MD Responsible Provider: Teresita Nieves MD    Anesthesia Type: General ASA Status: 3          Anesthesia Type: General    Ilan Phase I: Ilan Score: 8    Ilan Phase II:        Anesthesia Post Evaluation    Patient location during evaluation: PACU  Patient participation: complete - patient participated  Level of consciousness: awake and awake and alert  Airway patency: patent  Nausea & Vomiting: no nausea  Complications: no  Cardiovascular status: hemodynamically stable  Respiratory status: acceptable  Hydration status: euvolemic  Multimodal analgesia pain management approach  Pain management: adequate

## 2023-11-29 NOTE — ANESTHESIA PRE PROCEDURE
Department of Anesthesiology  Preprocedure Note       Name:  Bryn Graham   Age:  47 y.o.  :  1970                                          MRN:  [id number]         Date:  2023      Surgeon: Sarah Grimaldo):  Toribio Koehler MD    Procedure: Procedure(s):  ERAS/ GASTRIC BYPASS ALICE-EN-Y LAPAROSCOPIC ROBOTIC  EGD ESOPHAGOGASTRODUODENOSCOPY    Medications prior to admission:   Prior to Admission medications    Medication Sig Start Date End Date Taking? Authorizing Provider   montelukast (SINGULAIR) 10 MG tablet TAKE 1 TABLET BY MOUTH AT NIGHT  FOR ALLERGIES 23   Nicky Nickels, DO   solifenacin (VESICARE) 5 MG tablet TAKE 1 TABLET BY MOUTH DAILY FOR A CONDITION WHERE THE URGE TO  URINATE RESULTS IN URINE LEAKAGE 10/11/23   Nicky Nickels, DO   ondansetron (ZOFRAN) 4 MG tablet Take 1 tablet by mouth 3 times daily as needed for Nausea or Vomiting  Patient not taking: Reported on 2023   Sergio Alexander PA   pantoprazole (PROTONIX) 40 MG tablet Take 1 tablet by mouth daily  Patient not taking: Reported on 2023   Idania Ordoñez,    FLUoxetine (PROZAC) 40 MG capsule Take 2 capsules by mouth at bedtime    ProviderAna Cristina MD   traZODone (DESYREL) 100 MG tablet Take 1 tablet by mouth nightly    Ana Cristina Vuong MD   amLODIPine (NORVASC) 5 MG tablet Take 1 tablet by mouth daily  Patient taking differently: Take 1 tablet by mouth every evening 23   Nicky Nickels, DO   losartan-hydroCHLOROthiazide [facility]) 100-12.5 MG per tablet Take 1 tablet by mouth at bedtime For BP 23   Nicky Nickels, DO   ADDERALL XR 10 MG capsule Take 1 capsule by mouth every morning.  23   Ana Cristina Vuong MD   lamoTRIgine (LAMICTAL) 100 MG tablet Take 1 tablet by mouth every evening 23   Ana Cristina Vuong MD   betamethasone dipropionate 0.05 % ointment Apply topically daily as needed for rash 23   Nicky Nickels, DO

## 2023-11-29 NOTE — OP NOTE
supine position with padding in all pressure points. Anesthesia was induced and patient was intubated. Patient received preoperative antibiotics. Abdomen was prepped and draped in standard sterile fashion. A timeout was performed with all team members present. A 1 cm horizontal incision was made 17 cm from sternal notch to the left of the umbilicus. A Veress needle was inserted. Saline drop test was normal. The abdomen was insufflated with carbon dioxide to a pressure of 15 mmHg. The patient tolerated the insufflation well. A 8 mm robotic trocar was inserted bluntly. A general survey was performed and no injury was observed from trocar placement. A 12 mm robotic trocar was inserted about 10 cm to the left of the previously placed port. A 8 mm robotic trocar and 8 mm Air seal trocar were carefully placed under direct visualization in the left upper quadrant. A 5 mm tunnel was created distal and left lateral to the xiphoid, a Karin liver retractor advanced, and the liver retracted. A bilateral abdominal tap block was performed using Marcaine. The patient was placed in 22 degrees of reverse Trendelenberg and 6 degrees of right side tilt. The robot was docked. The robotic instruments were carefully inserted under direct visualization. A general survey was performed and no adhesions were seen. No obvious hiatal hernia was seen. Attention was turned to the stomach. The phrenogastric ligament was carefully divided. The cardia of the stomach where the lower edge of the pouch was delineated by dissecting posterior to the stomach. Next, the stomach pouch was created by dividing it from the lower stomach using sequential firings the Sure Form stapler with blue and white loads using a 38 Fr blunt bougie for calibration. Attention was turned to the omentum which was retracted cephalad and then divided with the Vessel Sealer. The transverse colon and the ligament of Treitz were identified.  The small bowel was measured

## 2023-11-29 NOTE — PROGRESS NOTES
TRANSFER - IN REPORT:    Verbal report received from RICKI Nam on [facility].  being received from PACU for routine post-op      Report consisted of patient's Situation, Background, Assessment and   Recommendations(SBAR). Information from the following report(s) Surgery Report and MAR was reviewed with the receiving nurse. Opportunity for questions and clarification was provided.

## 2023-11-29 NOTE — PERIOP NOTE
TRANSFER - OUT REPORT:    Verbal report given to Lect[facility] on [facility]  being transferred to [facility] [phone] for routine progression of patient care       Report consisted of patient's Situation, Background, Assessment and   Recommendations(SBAR). Information from the following report(s) Nurse Handoff Report was reviewed with the receiving nurse. Lines:   Peripheral IV 11/29/23 Left;Posterior Hand (Active)   Site Assessment Clean, dry & intact 11/29/23 1123   Line Status Infusing 11/29/23 1123   Phlebitis Assessment No symptoms 11/29/23 1123   Infiltration Assessment 0 11/29/23 1123   Alcohol Cap Used No 11/29/23 0823   Dressing Status Clean, dry & intact 11/29/23 1123   Dressing Type Transparent 11/29/23 0823   Dressing Intervention New 11/29/23 0823       Peripheral IV (Active)        Opportunity for questions and clarification was provided.       Patient transported with:  O2 @ 4lpm

## 2023-11-30 VITALS
SYSTOLIC BLOOD PRESSURE: 103 MMHG | OXYGEN SATURATION: 97 % | DIASTOLIC BLOOD PRESSURE: 46 MMHG | HEART RATE: 67 BPM | TEMPERATURE: 97.3 F | RESPIRATION RATE: 16 BRPM | WEIGHT: 291.2 LBS | HEIGHT: 69 IN | BODY MASS INDEX: 43.13 KG/M2

## 2023-11-30 LAB
ANION GAP SERPL CALC-SCNC: 3 MMOL/L (ref 2–11)
BASOPHILS # BLD: 0 K/UL (ref 0–0.2)
BASOPHILS NFR BLD: 0 % (ref 0–2)
BUN SERPL-MCNC: 14 MG/DL (ref 6–23)
CALCIUM SERPL-MCNC: 8.4 MG/DL (ref 8.3–10.4)
CHLORIDE SERPL-SCNC: 107 MMOL/L (ref 101–110)
CO2 SERPL-SCNC: 31 MMOL/L (ref 21–32)
CREAT SERPL-MCNC: 1.02 MG/DL (ref 0.6–1)
DIFFERENTIAL METHOD BLD: ABNORMAL
EOSINOPHIL # BLD: 0 K/UL (ref 0–0.8)
EOSINOPHIL NFR BLD: 1 % (ref 0.5–7.8)
ERYTHROCYTE [DISTWIDTH] IN BLOOD BY AUTOMATED COUNT: 13.5 % (ref 11.9–14.6)
GLUCOSE SERPL-MCNC: 108 MG/DL (ref 65–100)
HCT VFR BLD AUTO: 32.8 % (ref 35.8–46.3)
HGB BLD-MCNC: 10.3 G/DL (ref 11.7–15.4)
IMM GRANULOCYTES # BLD AUTO: 0 K/UL (ref 0–0.5)
IMM GRANULOCYTES NFR BLD AUTO: 0 % (ref 0–5)
LYMPHOCYTES # BLD: 1.3 K/UL (ref 0.5–4.6)
LYMPHOCYTES NFR BLD: 21 % (ref 13–44)
MCH RBC QN AUTO: 29 PG (ref 26.1–32.9)
MCHC RBC AUTO-ENTMCNC: 31.4 G/DL (ref 31.4–35)
MCV RBC AUTO: 92.4 FL (ref 82–102)
MONOCYTES # BLD: 0.5 K/UL (ref 0.1–1.3)
MONOCYTES NFR BLD: 7 % (ref 4–12)
NEUTS SEG # BLD: 4.6 K/UL (ref 1.7–8.2)
NEUTS SEG NFR BLD: 71 % (ref 43–78)
NRBC # BLD: 0 K/UL (ref 0–0.2)
PLATELET # BLD AUTO: 236 K/UL (ref 150–450)
PMV BLD AUTO: 11.6 FL (ref 9.4–12.3)
POTASSIUM SERPL-SCNC: 4.1 MMOL/L (ref 3.5–5.1)
RBC # BLD AUTO: 3.55 M/UL (ref 4.05–5.2)
SODIUM SERPL-SCNC: 141 MMOL/L (ref 133–143)
WBC # BLD AUTO: 6.5 K/UL (ref 4.3–11.1)

## 2023-11-30 PROCEDURE — 80048 BASIC METABOLIC PNL TOTAL CA: CPT

## 2023-11-30 PROCEDURE — 6370000000 HC RX 637 (ALT 250 FOR IP): Performed by: NURSE PRACTITIONER

## 2023-11-30 PROCEDURE — 85025 COMPLETE CBC W/AUTO DIFF WBC: CPT

## 2023-11-30 PROCEDURE — 36415 COLL VENOUS BLD VENIPUNCTURE: CPT

## 2023-11-30 PROCEDURE — 97530 THERAPEUTIC ACTIVITIES: CPT

## 2023-11-30 PROCEDURE — 6370000000 HC RX 637 (ALT 250 FOR IP): Performed by: PHYSICIAN ASSISTANT

## 2023-11-30 PROCEDURE — APPSS30 APP SPLIT SHARED TIME 16-30 MINUTES: Performed by: PHYSICIAN ASSISTANT

## 2023-11-30 PROCEDURE — 6360000002 HC RX W HCPCS: Performed by: PHYSICIAN ASSISTANT

## 2023-11-30 RX ADMIN — POTASSIUM CHLORIDE AND SODIUM CHLORIDE: 900; 150 INJECTION, SOLUTION INTRAVENOUS at 00:49

## 2023-11-30 RX ADMIN — TROSPIUM CHLORIDE 20 MG: 20 TABLET, FILM COATED ORAL at 05:25

## 2023-11-30 RX ADMIN — GABAPENTIN 300 MG: 300 CAPSULE ORAL at 09:14

## 2023-11-30 RX ADMIN — HEPARIN SODIUM 5000 UNITS: 5000 INJECTION INTRAVENOUS; SUBCUTANEOUS at 05:25

## 2023-11-30 RX ADMIN — SUCRALFATE 1 G: 1 TABLET ORAL at 12:23

## 2023-11-30 RX ADMIN — ACETAMINOPHEN 650 MG: 325 TABLET, FILM COATED ORAL at 12:23

## 2023-11-30 RX ADMIN — POTASSIUM CHLORIDE AND SODIUM CHLORIDE: 900; 150 INJECTION, SOLUTION INTRAVENOUS at 06:46

## 2023-11-30 RX ADMIN — KETOROLAC TROMETHAMINE 30 MG: 30 INJECTION, SOLUTION INTRAMUSCULAR at 05:25

## 2023-11-30 RX ADMIN — ACETAMINOPHEN 650 MG: 325 TABLET, FILM COATED ORAL at 05:25

## 2023-11-30 RX ADMIN — SUCRALFATE 1 G: 1 TABLET ORAL at 05:25

## 2023-11-30 ASSESSMENT — PAIN SCALES - GENERAL: PAINLEVEL_OUTOF10: 0

## 2023-11-30 NOTE — PROGRESS NOTES
[facility] End of Shift Note      Day of Surgery    Voiding: Yes    Flatus: Yes    Tolerating Clear Liquids?: Yes    Tolerating Ensure Max?: Yes    Ambulated in hallway 2 times. Up to chair for 1 meals.     Lidocaine: Yes    PRN Pain Medications Used?: Yes    IS Used: Yes    Ideal body weight: 66.2 kg (146 lb 0.4 oz)    Signed By: Puneet Mckee RN     November 29, 2023

## 2023-11-30 NOTE — PROGRESS NOTES
Bariatric Surgery Daily Progress Note    Patient: Beckie Snyder  MRN: [id number]  Date: 11/30/2023 8:29 AM  Admit Date: 11/29/2023  Procedure: robotic assisted gastric bypass    Subjective:     Beckie Snyder is a 47 y.o. female who is POD #1 s/p gastric bypass completed by Dr. Mckay Lozada. She reports that she is doing well this morning and feeling good. She denies abdominal pain, nausea or vomiting. She has been OOB ambulating, voiding without difficulty, using the incentive spirometer, and tolerating adequate PO intake including protein shakes.      Review of Systems   General ROS: negative for - fever or chills  Psychological ROS: negative for - memory difficulties  Ophthalmic ROS: negative for - blurry vision or double vision  Respiratory ROS: negative for - cough, shortness of breath, or wheezing  Cardiovascular ROS: negative for - chest pain  Gastrointestinal ROS: negative for - nausea, vomiting or dry heaving  Musculoskeletal ROS: negative for - pain in the lower extremities  Neurological ROS: negative for - dizziness, light headedness, headaches or numbness     Objective:     MEDS:    Current Facility-Administered Medications   Medication Dose Route Frequency Provider Last Rate Last Admin    diphenhydrAMINE (BENADRYL) capsule 25 mg  25 mg Oral Q6H PRN Courtney Alexander PA        Or    diphenhydrAMINE (BENADRYL) injection 25 mg  25 mg IntraVENous Q6H PRN Courtney Alexander PA        0.9% NaCl with KCl 20 mEq infusion   IntraVENous Continuous Kathy Alexander  mL/hr at 11/30/23 0646 New Bag at 11/30/23 0646    sodium chloride flush 0.9 % injection 5-40 mL  5-40 mL IntraVENous 2 times per day Courtney Alexander PA        sodium chloride flush 0.9 % injection 5-40 mL  5-40 mL IntraVENous PRN Courtney Alexander PA        0.9 % sodium chloride infusion   IntraVENous PRN Courtney Alexander PA        acetaminophen (TYLENOL) tablet 650 mg  650 mg Oral Q6H Jackie Alexander 11.1 K/uL    RBC 3.55 (L) 4.05 - 5.2 M/uL    Hemoglobin 10.3 (L) 11.7 - 15.4 g/dL    Hematocrit 32.8 (L) 35.8 - 46.3 %    MCV 92.4 82.0 - 102.0 FL    MCH 29.0 26.1 - 32.9 PG    MCHC 31.4 31.4 - 35.0 g/dL    RDW 13.5 11.9 - 14.6 %    Platelets 974 776 - 534 K/uL    MPV 11.6 9.4 - 12.3 FL    nRBC 0.00 0.0 - 0.2 K/uL    Differential Type AUTOMATED      Neutrophils % 71 43 - 78 %    Lymphocytes % 21 13 - 44 %    Monocytes % 7 4.0 - 12.0 %    Eosinophils % 1 0.5 - 7.8 %    Basophils % 0 0.0 - 2.0 %    Immature Granulocytes 0 0.0 - 5.0 %    Neutrophils Absolute 4.6 1.7 - 8.2 K/UL    Lymphocytes Absolute 1.3 0.5 - 4.6 K/UL    Monocytes Absolute 0.5 0.1 - 1.3 K/UL    Eosinophils Absolute 0.0 0.0 - 0.8 K/UL    Basophils Absolute 0.0 0.0 - 0.2 K/UL    Absolute Immature Granulocyte 0.0 0.0 - 0.5 K/UL       Imaging: No images were independently reviewed. Assessment / Plan: Active Hospital Problems    Diagnosis Date Noted    Morbid obesity ([address]) [E66.01] 09/12/2023       Isaias Ramos is a 47 y.o. female s/p robotic assisted gastric bypass    Pain control   2. Bariatric diet - clear liquids, Ensure Max protein  3. Lap incisions C/D/I  4. No tachycardia overnight   5. Labs reviewed. WBC 6.5. Hgb stable 10.3.  6.   OOB and ambulate as tolerated. PT consulted. 7.   DVT prophylaxis - SCDs/Heparin  8. Abdominal binder for comfort    Discharge: to home today    Jewels sOborn PA-C  Date: 11/30/2023    Counseling time:counseling time more than 50% of visit: 20 minutes: I spent this time preparing to see patient (including chart review and preparation), obtaining and/or reviewing additional medical history, performing a physical exam and evaluation, documenting clinical information in the electronic health record, independently interpreting results, communicating results to patient, family or caregiver, and/or coordinating care.

## 2023-11-30 NOTE — PLAN OF CARE
Problem: Pain  Goal: Verbalizes/displays adequate comfort level or baseline comfort level  Outcome: Progressing     Problem: Discharge Planning  Goal: Discharge to home or other facility with appropriate resources  Outcome: Progressing     Problem: Safety - Adult  Goal: Free from fall injury  Outcome: Progressing     Pt ambulated around the almanzar two times, pt is voiding, pt is tolerating PO fluids, pt did not ask for PRN pain medications.

## 2023-11-30 NOTE — PROGRESS NOTES
ACUTE PHYSICAL THERAPY GOALS:   (Developed with and agreed upon by patient and/or caregiver.)  STG:  (1.)Ms. Janiya Myles will move from supine to sit and sit to supine  with INDEPENDENT within one treatment day(s). MET 11/30/23  (2.)Ms. Janiya Myles will transfer from bed to chair and chair to bed with INDEPENDENT using the least restrictive device within one treatment day(s). MET 11/30/23  (3.)Ms. Janiya Myles will ambulate with INDEPENDENT for 650 feet with the least restrictive device within one treatment day(s). MET 11/30/23  (4)Ms. Janiya Myles will ascend/descend four steps with railing with cga in two treatment days. MET 11/30/23  ________________________________________________________________________________________________      PHYSICAL THERAPY Daily Note and AM  (Link to Caseload Tracking: PT Visit Days : 2  Acknowledge Orders  Time In/Out  PT Charge Capture  Rehab Caseload Tracker    Bryn Graham is a 47 y.o. female   PRIMARY DIAGNOSIS: Morbid obesity ([address])  Morbid obesity ([address]) [E66.01]  Procedure(s) (LRB):  ERAS/ GASTRIC BYPASS ALICE-EN-Y LAPAROSCOPIC ROBOTIC (N/A)  EGD ESOPHAGOGASTRODUODENOSCOPY (N/A)  1 Day Post-Op  Reason for Referral: Difficulty in walking, Not elsewhere classified (R26.2)  Other abnormalities of gait and mobility (R26.89)  Inpatient: Payor: Elizabeth Argueta / Plan: [address] [place] [address] A / Product Type: *No Product type* /     ASSESSMENT:     REHAB RECOMMENDATIONS:   Recommendation to date pending progress:  Setting:  No further skilled physical therapy after discharge from hospital    Equipment:    None     ASSESSMENT:  Ms. Janiya Myles is admitted with above diagnosis with decreased independence with functional mobility. She would benefit from PT to address weakness, lifting and PT HEP education, functional mobility and standing balance. She plans on returning home later today with her spouse's assist. This am we review her PT HEP and lifting restrictions. She performs exs with cues.  She

## 2023-11-30 NOTE — PROGRESS NOTES
PT was in bed with Spouse at bedside. [id number] [id number] [facility] introduced self. [address] [address] checked for unmet needs and offered support. Rev. Jesus Ball, MOnur.

## 2023-11-30 NOTE — CARE COORDINATION
11/30/23 0947   Service Assessment   Patient Orientation Alert and Oriented   Cognition Alert   History Provided By Patient   Primary Caregiver Self   Support Systems Spouse/Significant Other   Patient's Healthcare Decision Maker is: Legal Next of Kin   PCP Verified by CM Yes   Last Visit to PCP Within last 6 months   Prior Functional Level Independent in ADLs/IADLs   Current Functional Level Independent in ADLs/IADLs   Can patient return to prior living arrangement Yes   Ability to make needs known: Good   Family able to assist with home care needs: Yes   Would you like for me to discuss the discharge plan with any other family members/significant others, and if so, who? No   Financial Resources Other (Comment)  [employer] [facility])   Community Resources None   Social/Functional History   Lives With Spouse   Home Layout One level   Home Access Stairs to enter with rails   Entrance Stairs - Number of Steps 4   Condition of Participation: Discharge Planning   The Plan for Transition of Care is related to the following treatment goals: return home   The Patient and/or Patient Representative was provided with a Choice of Provider? Patient   The Patient and/Or Patient Representative agree with the Discharge Plan? Yes   Freedom of Choice list was provided with basic dialogue that supports the patient's individualized plan of care/goals, treatment preferences, and shares the quality data associated with the providers? Yes     Assessment completed with patient in room this AM. Patient lives with spouse. Patient is IND with all adls at baseline. Patient drives and works full time. Demographics and PCP confirmed. Patient discharging home today with spouse. No discharge needs.     Chris MISTRYSW, ACM  Juliet Wagoner

## 2023-11-30 NOTE — DISCHARGE INSTRUCTIONS
Bariatric Surgery Discharge Instructions    Surgeon: Dr. Kiesha Chou    Follow up: Follow up with your surgeon or physician assistant as previously scheduled in 1-2 weeks. [address] [place] [address] [facility]  Office number: [phone]    Diet:  When discharged from the hospital, you may begin clear and full liquid diet plus protein supplements  Start with clear liquids and progress to full liquids as tolerated  Goals: 60 grams of protein per day, 64 ounces of fluid per day  Avoid carbonated beverages and straws, avoid excessive air swallowing when drinking/eating, minimize caffeine intake. No alcohol. Wound care:  Surgical glue will flake off in 7-10 days; the edges of steri-strips may come up but leave them in place until your follow up appointment  May shower following surgery. It is okay to get soap and water on all wounds when showering. Do not soak wounds under water (bath, pool, hot tub) until healed about three weeks after surgery. Activity:  No lifting more than 20 lbs for 3-4 weeks. No repetitive abdominal straining (sit-ups, push-ups, crunches, pull-ups, squats), for 3 weeks. Okay to perform normal activities of daily living and walk up stairs. Walk daily. Continue deep breathing and use incentive spirometer at home. Return to school or work when you fee comfortable. Typically 1-2 weeks for a desk job or up to 4 weeks for a manual labor job. You may resume driving when you have minimal pain and are not taking narcotic pain medication. Medications: You will have been prescribed the following medications prior to discharge:  Percocet (5mg): take one pill by mouth every 4 hours as needed for pain  Zofran (8mg): take one pill by mouth every 8 hours for nausea or vomiting  Omeprazole (40mg): take one pill by mouth daily for 90 days  Use an over the counter stool softener (Miralax) or laxative (Ducolax, milk of magnesium) if you feel constipated.  You may not have a normal bowel movement being on a liquid diet. Do NOT take any NSAID medication (Ibuprofen, Aleve, Motrin, Naproxen, Celebrex, etc) after surgery  No nicotine in any form (cigarettes, vape, marijuana, chew, gum, patches)  It is acceptable to space multiple medications throughout the day as needed  Oral medications can be crushed if they are large; acceptable to take whole pills if they are small  You may resume home medications unless instructed otherwise    Notify your surgeon if. ..   Fever of 101.5 degrees F or 38 degrees C (by mouth)  Shortness of breath or difficulty breathing  Chest pain or very fast heart rate  Significant leg swelling and/or pain  Redness, swelling, foul-smelling drainage, bleeding or heat around your incisions  Persistent vomiting and/or inability to keep fluids down, lightheadedness  Significant abdominal bloating, swelling or pain    Information for you  Monitor blood pressure at home  Follow up with PCP to address blood pressure medications

## 2023-12-01 ENCOUNTER — CARE COORDINATION (OUTPATIENT)
Dept: CARE COORDINATION | Facility: CLINIC | Age: 53
End: 2023-12-01

## 2023-12-01 NOTE — CARE COORDINATION
Care Transitions Initial Follow Up Call    Call within 2 business days of discharge: Yes    Patient Current Location:  Home: [address] [place] [address]    Care Transition Nurse contacted the patient by telephone to perform post hospital discharge assessment. Verified name and  with patient as identifiers. Provided introduction to self, and explanation of the Care Transition Nurse role. Patient: Lucinda Alcantar Patient : 1970   MRN: [id number]  Reason for Admission: Gastric bypass  Discharge Date: 23 RARS: Readmission Risk Score: 6      Last Discharge Facility       Date Complaint Diagnosis Description Type Department Provider    23   Admission (Discharged) Balaji Leal MD            Was this an external facility discharge? No Discharge Facility: [facility]    Challenges to be reviewed by the provider   Additional needs identified to be addressed with provider: No  none               Method of communication with provider: none. Patient reports they are following instructions set by surgeon and per bariatric program including diet. Plans to attend follow up appointment. No questions or concerns at this time. Care Transition Nurse reviewed discharge instructions with patient who verbalized understanding. The patient was given an opportunity to ask questions and does not have any further questions or concerns at this time. Were discharge instructions available to patient? Yes. Reviewed appropriate site of care based on symptoms and resources available to patient including: PCP  Specialist  Urgent care clinics  When to call 911. The patient agrees to contact the PCP office for questions related to their healthcare. Advance Care Planning:   Does patient have an Advance Directive:  has ACP docs . Medication reconciliation was performed with patient, who verbalizes understanding of administration of home medications.  Medications reviewed, 1111F

## 2023-12-01 NOTE — PROGRESS NOTES
Diagnosis Date    Abnormal Pap smear of cervix     Adverse effect of anesthesia     metabolizes anesthesia quickly    Anxiety     managed with meds    Arthritis     Depression     managed with meds    Former smoker     Heartburn     daily meds    Hyperlipidemia     Hypertension     managed with meds    Insomnia     Lung nodule     BENIGN, stable with serial 2 yr f/u, recent MRI stable    Obesity     BMI-39.43    Rash     Seasonal allergies     uses inhalers and nebs for allergies when needed    Urge incontinence      Patient Active Problem List   Diagnosis    Severe obesity ([address])    Urge incontinence    Vaginal dryness    Major depressive disorder, single episode, unspecified    History of tobacco use    Multiple lung nodules on CT    Insomnia    Hyperlipidemia    Recurrent major depressive disorder, in full remission ([address])    History of gastroesophageal reflux (GERD)    GERD (gastroesophageal reflux disease)    Hypertension    Achalasia of the cricopharyngeus muscle    Oropharyngeal dysphagia    Morbid obesity ([address])        PSH:  Past Surgical History:   Procedure Laterality Date    BUNIONECTOMY       SECTION      x 3    ESOPHAGOSCOPY Bilateral 10/20/2023    ESOPHAGOSCOPY DILATATION performed by Elise Gusman DO at [facility]    GYN      Cervical cryosurgery for abnormal cells    LARYNGOSCOPY Bilateral 10/20/2023    Direct suspension laryngoscopy/hypopharyngoscopy with Botox injection of cricopharyngeal muscle performed by Elise Gusman DO at [facility]    LIPECTOMY      after wieght loss    ALICE-EN-Y GASTRIC BYPASS N/A 2023    ERAS/ GASTRIC BYPASS ALICE-EN-Y LAPAROSCOPIC ROBOTIC performed by Lizz Ramirez MD at [address] [place] [address] ENDOSCOPY N/A 2023    EGD BIOPSY performed by Lizz Ramirez MD at [address] [place] [address] N/A 2023    EGD

## 2023-12-04 ENCOUNTER — OFFICE VISIT (OUTPATIENT)
Dept: SURGERY | Age: 53
End: 2023-12-04

## 2023-12-04 VITALS
WEIGHT: 254 LBS | HEART RATE: 83 BPM | DIASTOLIC BLOOD PRESSURE: 84 MMHG | SYSTOLIC BLOOD PRESSURE: 137 MMHG | BODY MASS INDEX: 37.62 KG/M2 | HEIGHT: 69 IN

## 2023-12-04 DIAGNOSIS — E66.9 OBESITY, CLASS II, BMI 35-39.9: ICD-10-CM

## 2023-12-04 DIAGNOSIS — Z71.82 EXERCISE COUNSELING: ICD-10-CM

## 2023-12-04 DIAGNOSIS — Z98.84 S/P GASTRIC BYPASS: ICD-10-CM

## 2023-12-04 DIAGNOSIS — E66.01 MORBID OBESITY (HCC): Primary | ICD-10-CM

## 2023-12-04 DIAGNOSIS — Z71.3 DIETARY COUNSELING: ICD-10-CM

## 2023-12-04 DIAGNOSIS — R10.9 RIGHT SIDED ABDOMINAL PAIN: ICD-10-CM

## 2023-12-04 PROCEDURE — 99024 POSTOP FOLLOW-UP VISIT: CPT | Performed by: PHYSICIAN ASSISTANT

## 2023-12-04 NOTE — PROGRESS NOTES
Physician Progress Note      PATIENT:               Coco Kirkland  CSN #:                  [id number]  :                       1970  ADMIT DATE:       2023 5:48 AM  DISCH DATE:        2023 1:30 PM  RESPONDING  PROVIDER #:        Leatha VALIENTE          QUERY TEXT:    Patient admitted for scheduled surgery. Noted documentation of depression on   problem list.  Both\" major depressive disorder, single episode' and \"recurrent   major depressive disorder, in full remission\" are documented. Please   document in progress notes and discharge summary if you are   treating/evaluating the following: The medical record reflects the following:  Risk Factors: depression  Clinical Indicators: Both major depressive disorder single episode and   recurrent major depressive disorder, in full remission are documented. Treatment: Prozac  Options provided:  -- Major depression, single episode: unspecified  -- Recurrent major depressive disorder, in full remission  -- Other - I will add my own diagnosis  -- Disagree - Not applicable / Not valid  -- Disagree - Clinically unable to determine / Unknown  -- Refer to Clinical Documentation Reviewer    PROVIDER RESPONSE TEXT:    Provider disagreed with this query. We are not treating or evaluating this problem. This was established prior to   us and treated by another provider.     Query created by: Sandra Shook on 2023 7:55 AM      Electronically signed by:  Anival VALIENTE 2023 8:15 AM

## 2023-12-04 NOTE — DISCHARGE SUMMARY
Medications:      Medication List        START taking these medications      pantoprazole 40 MG tablet  Commonly known as: PROTONIX  Take 1 tablet by mouth daily            CHANGE how you take these medications      amLODIPine 5 MG tablet  Commonly known as: NORVASC  Take 1 tablet by mouth daily  What changed: when to take this            CONTINUE taking these medications      Adderall XR 10 MG extended release capsule  Generic drug: amphetamine-dextroamphetamine     * albuterol (2.5 MG/3ML) 0.083% nebulizer solution  Commonly known as: PROVENTIL     * albuterol sulfate  (90 Base) MCG/ACT inhaler  Commonly known as: PROVENTIL;VENTOLIN;PROAIR     betamethasone dipropionate 0.05 % ointment  Apply topically daily as needed for rash     betamethasone valerate 0.1 % lotion  Commonly known as: VALISONE  Use topically twice daily as needed to irritated or itchy skin/rash.   Do not use for more than 7 days in a row     fexofenadine 180 MG tablet  Commonly known as: ALLEGRA  Take 1 tablet by mouth daily as needed (allergies)     FLUoxetine 40 MG capsule  Commonly known as: PROzac     fluticasone 50 MCG/ACT nasal spray  Commonly known as: FLONASE  2 sprays by Nasal route daily     lamoTRIgine 100 MG tablet  Commonly known as: LAMICTAL     losartan-hydroCHLOROthiazide 100-12.5 MG per tablet  Commonly known as: HYZAAR  Take 1 tablet by mouth at bedtime For BP     montelukast 10 MG tablet  Commonly known as: SINGULAIR  TAKE 1 TABLET BY MOUTH AT NIGHT  FOR ALLERGIES     nystatin 215214 UNIT/GM powder  Commonly known as: MYCOSTATIN  Apply powder to skin folds 3 times a day as needed for rash     ondansetron 4 MG tablet  Commonly known as: ZOFRAN  Take 1 tablet by mouth 3 times daily as needed for Nausea or Vomiting     solifenacin 5 MG tablet  Commonly known as: VESICARE  TAKE 1 TABLET BY MOUTH DAILY FOR A CONDITION WHERE THE URGE TO  URINATE RESULTS IN URINE LEAKAGE     traZODone 100 MG tablet  Commonly known as: DESYREL

## 2023-12-05 ENCOUNTER — TELEPHONE (OUTPATIENT)
Dept: FAMILY MEDICINE CLINIC | Facility: CLINIC | Age: 53
End: 2023-12-05

## 2023-12-05 NOTE — TELEPHONE ENCOUNTER
Called and spoke to pt, regarding recent hospitalization. Pt stated she is doing well and will continue to follow up with [address] [place] [address] [facility].  Pt stated she will keep her appointment with Dr. Machelle Agustin in January. Pt stated she is needing a refill on Prozac.   Mail order pharmacy, [facility]

## 2023-12-12 ENCOUNTER — CARE COORDINATION (OUTPATIENT)
Dept: CARE COORDINATION | Facility: CLINIC | Age: 53
End: 2023-12-12

## 2023-12-12 NOTE — CARE COORDINATION
Care Transitions Outreach Attempt    Call within 2 business days of discharge: Yes   Attempted to reach patient for transitions of care follow up. Unable to reach patient. Patient: Donna Parsons Patient : 1970 MRN: [id number]    Last Discharge Facility       Date Complaint Diagnosis Description Type Department Provider    23   Admission (Discharged) Awilda Ennis MD              Was this an external facility discharge?  No Discharge Facility Name: [facility]    Noted following upcoming appointments from discharge chart review:   [facility] follow up appointment(s):   Future Appointments   Date Time Provider [address]   2023 10:15 AM STEFFANIE Lala [facility] AMB   2024  4:40 PM Ramona Alexander DO [facility] AMB   2024 11:00 AM MARCO ANTONIO SteinBHPST [facility] AMB   2024  3:45 PM Tere Braxton DO E[facility] AMB   3/4/2024 11:00 AM Maria Victoria Alexander PA C[facility] AMB   2024 11:00 AM Maria Victorai Alexanedr PA [facility] AMB   2024 10:50 AM Rodrigo Calvin MD [facility] AMB     [facility]): none noted

## 2024-01-04 ENCOUNTER — CARE COORDINATION (OUTPATIENT)
Dept: CARE COORDINATION | Facility: CLINIC | Age: 54
End: 2024-01-04

## 2024-01-04 NOTE — CARE COORDINATION
Care Transitions Outreach Attempt    Call within 2 business days of discharge: Yes   Attempted to reach patient for transitions of care follow up. Unable to reach patient.    Patient: Adriana Kurtz Patient : 1970 MRN: [id number]    Last Discharge Facility       Date Complaint Diagnosis Description Type Department Provider    23   Admission (Discharged) S[facility]3MS Kori Dahl MD              Was this an external facility discharge? No Discharge Facility Name: [facility]    Noted following upcoming appointments from discharge chart review:   [facility] follow up appointment(s):   Future Appointments   Date Time Provider Department Center   2024 10:45 AM Courtney Alexander PA [facility] AMB   2024  4:40 PM Gilma Sosa DO [facility] AMB   2024 11:00 AM Silvia Patton LISW BSB[facility] AMB   2024  3:45 PM Isabel Hadley DO [facility] AMB   3/4/2024 11:00 AM Courtney Alexander PA [facility] AMB   2024 11:00 AM Courtney Alexander PA [facility] AMB   2024 10:50 AM Kori Dahl MD [facility] AMB     [facility]  follow up appointment(s): none noted

## 2024-01-11 RX ORDER — SOLIFENACIN SUCCINATE 5 MG/1
TABLET, FILM COATED ORAL
Qty: 90 TABLET | Refills: 0 | Status: SHIPPED | OUTPATIENT
Start: 2024-01-11

## 2024-01-12 ENCOUNTER — OFFICE VISIT (OUTPATIENT)
Dept: SURGERY | Age: 54
End: 2024-01-12

## 2024-01-12 VITALS
WEIGHT: 243 LBS | HEIGHT: 69 IN | BODY MASS INDEX: 35.99 KG/M2 | SYSTOLIC BLOOD PRESSURE: 136 MMHG | DIASTOLIC BLOOD PRESSURE: 74 MMHG | HEART RATE: 60 BPM

## 2024-01-12 DIAGNOSIS — E66.01 MORBID OBESITY (HCC): Primary | ICD-10-CM

## 2024-01-12 DIAGNOSIS — Z71.82 EXERCISE COUNSELING: ICD-10-CM

## 2024-01-12 DIAGNOSIS — Z98.84 S/P GASTRIC BYPASS: ICD-10-CM

## 2024-01-12 DIAGNOSIS — E66.9 OBESITY, CLASS II, BMI 35-39.9: ICD-10-CM

## 2024-01-12 DIAGNOSIS — Z71.3 DIETARY COUNSELING: ICD-10-CM

## 2024-01-12 DIAGNOSIS — R21 RASH: ICD-10-CM

## 2024-01-12 PROCEDURE — 99024 POSTOP FOLLOW-UP VISIT: CPT | Performed by: PHYSICIAN ASSISTANT

## 2024-01-12 RX ORDER — NYSTATIN 100000 U/G
CREAM TOPICAL
Qty: 1 EACH | Refills: 2 | Status: SHIPPED | OUTPATIENT
Start: 2024-01-12

## 2024-01-12 NOTE — PROGRESS NOTES
[facility] NUTRITION REASSESSMENT    Assessment:  Pt is 6 weeks s/p RYGB.    Pt is doing well with soft diet compliance.    Intake of ~50g protein/d and ~60oz fluid/d.   Pt is eating yogurt, turkey, baked chicken; and drinking water.    Pt is exercising via walking for 30 minutes 5x week.    Pt is taking MVI and Ca supplements as recommended.  Pt reports no issues.     Intervention:   Discussed food choices and meal ideas on regular diet, once released by surgeon.   Encouraged pt to follow mindful eating behaviors, lean protein focus followed by non-starchy vegetables as able.  Encouraged pt use protein supplements throughout the day as snacks rather than as meal replacement.    Encouraged continued and increased activity.      Monitoring and Evaluation:  Monitor for continued safe, supervised weight loss for RYGB.     Monitor pt for meeting protein requirements of 60-80g/d, 64+ fl oz fluids.  Follow for tolerance of regular diet.   F/U per MD Tierney Platt MBA, Nutritionist, RD eligible   
    No current facility-administered medications for this visit.       ALLERGIES:    No Known Allergies    SH:  Social History     Tobacco Use    Smoking status: Former     Current packs/day: 0.00     Average packs/day: 1 pack/day for 31.9 years (31.9 ttl pk-yrs)     Types: Cigarettes     Start date: 1984     Quit date: 2016     Years since quittin.5     Passive exposure: Never    Smokeless tobacco: Never    Tobacco comments:     STARTED WHEN I WAS AN EARLY TEEN. DON'T REMEBER THE DATE.   Vaping Use    Vaping Use: Never used   Substance Use Topics    Alcohol use: No    Drug use: Not Currently     Types: Marijuana (Weed)     Comment: SMOKED MARIJUANA. this past hx no use as of        FH:  Family History   Problem Relation Age of Onset    High Cholesterol Mother         low blood pressure from age.    Diabetes Father     Hypertension Father     High Cholesterol Father     Cancer Father         fiordaliza       Review of systems:  The patient has no difficulty with chest pain or shortness of breath.  No fever or chills.  Comprehensive 13 point review of systems was otherwise unremarkable except as noted above.     Physical Exam:     /74   Pulse 60   Ht 1.753 m (5' 9\")   Wt 110.2 kg (243 lb)   BMI 35.88 kg/m²     General:  Well-developed, well-nourished, no distress.  Psych:  Cooperative, good insight and judgement.  Neuro:  Alert, oriented to person, place and time.  Lungs:  Unlabored breathing. Symmetrical chest expansion.   Heart:  Regular rate and rhythm.   Abdomen:  Soft, non-tender, non-distended. No guarding or rebound. Lap incisions are healing well.     Labs:  All labs reviewed today.    Imaging: All imaging reviewed today.     Diagnosis Orders   1. Morbid obesity (HCC)        2. Obesity, Class II, BMI 35-39.9        3. S/P gastric bypass        4. Dietary counseling        5. Exercise counseling        6. Rash  nystatin (MYCOSTATIN) 409146 UNIT/GM cream          Assessment/Plan:    Adriana STOKES

## 2024-01-22 ENCOUNTER — OFFICE VISIT (OUTPATIENT)
Dept: FAMILY MEDICINE CLINIC | Facility: CLINIC | Age: 54
End: 2024-01-22
Payer: COMMERCIAL

## 2024-01-22 VITALS
RESPIRATION RATE: 16 BRPM | BODY MASS INDEX: 35.73 KG/M2 | DIASTOLIC BLOOD PRESSURE: 74 MMHG | HEART RATE: 61 BPM | SYSTOLIC BLOOD PRESSURE: 120 MMHG | HEIGHT: 69 IN | OXYGEN SATURATION: 97 % | WEIGHT: 241.2 LBS

## 2024-01-22 DIAGNOSIS — J30.2 SEASONAL ALLERGIES: ICD-10-CM

## 2024-01-22 DIAGNOSIS — L20.84 INTRINSIC ECZEMA: ICD-10-CM

## 2024-01-22 DIAGNOSIS — N39.41 URGE INCONTINENCE: ICD-10-CM

## 2024-01-22 DIAGNOSIS — I10 ESSENTIAL HYPERTENSION: Primary | ICD-10-CM

## 2024-01-22 DIAGNOSIS — E66.9 OBESITY, CLASS II, BMI 35-39.9: ICD-10-CM

## 2024-01-22 PROBLEM — E66.812 OBESITY, CLASS II, BMI 35-39.9: Status: ACTIVE | Noted: 2023-09-12

## 2024-01-22 PROCEDURE — 99214 OFFICE O/P EST MOD 30 MIN: CPT | Performed by: FAMILY MEDICINE

## 2024-01-22 PROCEDURE — 3078F DIAST BP <80 MM HG: CPT | Performed by: FAMILY MEDICINE

## 2024-01-22 PROCEDURE — 3074F SYST BP LT 130 MM HG: CPT | Performed by: FAMILY MEDICINE

## 2024-01-22 PROCEDURE — G8417 CALC BMI ABV UP PARAM F/U: HCPCS | Performed by: FAMILY MEDICINE

## 2024-01-22 PROCEDURE — 1036F TOBACCO NON-USER: CPT | Performed by: FAMILY MEDICINE

## 2024-01-22 PROCEDURE — G8427 DOCREV CUR MEDS BY ELIG CLIN: HCPCS | Performed by: FAMILY MEDICINE

## 2024-01-22 PROCEDURE — G8484 FLU IMMUNIZE NO ADMIN: HCPCS | Performed by: FAMILY MEDICINE

## 2024-01-22 PROCEDURE — 3017F COLORECTAL CA SCREEN DOC REV: CPT | Performed by: FAMILY MEDICINE

## 2024-01-22 RX ORDER — SOLIFENACIN SUCCINATE 5 MG/1
TABLET, FILM COATED ORAL
Qty: 90 TABLET | Refills: 1 | Status: SHIPPED | OUTPATIENT
Start: 2024-01-22

## 2024-01-22 RX ORDER — MONTELUKAST SODIUM 10 MG/1
TABLET ORAL
Qty: 90 TABLET | Refills: 1 | Status: SHIPPED | OUTPATIENT
Start: 2024-01-22

## 2024-01-22 RX ORDER — AMLODIPINE BESYLATE 5 MG/1
5 TABLET ORAL EVERY EVENING
Qty: 90 TABLET | Refills: 1 | Status: SHIPPED | OUTPATIENT
Start: 2024-01-22

## 2024-01-22 ASSESSMENT — PATIENT HEALTH QUESTIONNAIRE - PHQ9
SUM OF ALL RESPONSES TO PHQ QUESTIONS 1-9: 1
SUM OF ALL RESPONSES TO PHQ9 QUESTIONS 1 & 2: 0
1. LITTLE INTEREST OR PLEASURE IN DOING THINGS: 0
3. TROUBLE FALLING OR STAYING ASLEEP: 0
2. FEELING DOWN, DEPRESSED OR HOPELESS: 0
SUM OF ALL RESPONSES TO PHQ QUESTIONS 1-9: 1
4. FEELING TIRED OR HAVING LITTLE ENERGY: 1
6. FEELING BAD ABOUT YOURSELF - OR THAT YOU ARE A FAILURE OR HAVE LET YOURSELF OR YOUR FAMILY DOWN: 0
8. MOVING OR SPEAKING SO SLOWLY THAT OTHER PEOPLE COULD HAVE NOTICED. OR THE OPPOSITE, BEING SO FIGETY OR RESTLESS THAT YOU HAVE BEEN MOVING AROUND A LOT MORE THAN USUAL: 0
5. POOR APPETITE OR OVEREATING: 0
10. IF YOU CHECKED OFF ANY PROBLEMS, HOW DIFFICULT HAVE THESE PROBLEMS MADE IT FOR YOU TO DO YOUR WORK, TAKE CARE OF THINGS AT HOME, OR GET ALONG WITH OTHER PEOPLE: 0
7. TROUBLE CONCENTRATING ON THINGS, SUCH AS READING THE NEWSPAPER OR WATCHING TELEVISION: 0
SUM OF ALL RESPONSES TO PHQ QUESTIONS 1-9: 1
SUM OF ALL RESPONSES TO PHQ QUESTIONS 1-9: 1
9. THOUGHTS THAT YOU WOULD BE BETTER OFF DEAD, OR OF HURTING YOURSELF: 0

## 2024-01-22 ASSESSMENT — ENCOUNTER SYMPTOMS
BLOOD IN STOOL: 0
VOMITING: 0
NAUSEA: 0
COUGH: 0
SHORTNESS OF BREATH: 0
ABDOMINAL PAIN: 0

## 2024-01-22 NOTE — PROGRESS NOTES
[facility]  Gilma Espinoza Ian, DO  [address] [place] [address]   No:  [phone]  Fax:  [phone]        Assessment/Plan:   Adriana was seen today for follow-up.    Diagnoses and all orders for this visit:    Essential hypertension  Controlled with amlodipine alone.  She had labs completed in November.  Including CBC and BMP.  Advised patient to monitor blood pressure at home, her daughter-in-law can check her blood pressure.  Advised patient to let me know if she starts getting fatigue, dizziness with standing or blood pressure starts decreasing to 110/60 or lower.  -     amLODIPine (NORVASC) 5 MG tablet; Take 1 tablet by mouth every evening    Urge incontinence  Controlled.  Continue Vesicare.  -     solifenacin (VESICARE) 5 MG tablet; TAKE 1 TABLET BY MOUTH DAILY FOR A CONDITION WHERE THE URGE TO  URINATE RESULTS IN URINE LEAKAGE    Obesity, Class II, BMI 35-39.9  She has lost over 40 pounds through lifestyle changes and bariatric surgery.  This has resulted in decreased need for blood pressure medications.    Seasonal allergies  Continue Allegra, Flonase and montelukast.  -     montelukast (SINGULAIR) 10 MG tablet; TAKE 1 TABLET BY MOUTH AT NIGHT  FOR ALLERGIES    Intrinsic eczema  Advised switching to a hydrating cream versus a lotion, continue with Dove body wash in the winter.  Continue betamethasone as needed.  Advised patient she can place the betamethasone in the refrigerator to cool it down to help with stinging that she is experiencing with use          Adriana Kurtz is a 53 y.o. female who is seen for evaluation of   Chief Complaint   Patient presents with    Follow-up     6 month follow up: HTN        HPI:   Hypertension: She has not not been taking losartan-HCTZ for about 4 weeks.  She was initially forgetting to take it.  But her blood pressure has been well-controlled without bloated bleed.  She has lost close to 50 pounds through diet and then bariatric

## 2024-01-22 NOTE — PATIENT INSTRUCTIONS

## 2024-01-26 ENCOUNTER — OFFICE VISIT (OUTPATIENT)
Dept: BEHAVIORAL/MENTAL HEALTH CLINIC | Facility: CLINIC | Age: 54
End: 2024-01-26

## 2024-01-26 DIAGNOSIS — Z87.828 HISTORY OF TRAUMA: ICD-10-CM

## 2024-01-26 DIAGNOSIS — F41.9 ANXIETY DISORDER, UNSPECIFIED TYPE: Primary | ICD-10-CM

## 2024-01-26 NOTE — PROGRESS NOTES
INDIVIDUAL THERAPY NOTE  NAME: Adriana Kurtz    DATE: 1/26/2024    TYPE OF SERVICE: Individual Therapy    LOCATION OF SERVICE: [facility]    DURATION: 60 mins    DIAGNOSIS: :    1. Anxiety disorder, unspecified type    2. History of trauma        CHIEF COMPLAINT: anxiety    MENTAL STATUS EXAM:  Pt was appropriately dressed and groomed.  Pt was 0x4. No unusual mannerisms were noted.  No psychotic symptoms displayed by pt.  Pt was cooperative and engaged in the session.  Insight and judgment were intact.  Denied suicidal or homicidal ideation.  Fund of knowledge and attention span are WNL.  Denies developmental or language difficulties.  Mood/Affect: mildly anxious/congruent  Thought Process: coherent    PLAN: CBT, DBT, CPT, Humanistic/Client Centered, ACT, Seeking Safety, Psychodynamic, ERP may be used to address goals.    Summary of Service:  This SW met with th ept face to face in the office.  This SW saw the pt months ago for her psych eval for bariatric surgery,  The pt had her surgery in Nov and she has lost 50 pounds.  She reports feeling better.  She is not happy in the relationship with her common law .  He has never been attentive or communicative and the pt has felt lonely in this relationship.  Thoughts and feelings were processed and guided questions were asked.  The pt stays with her partner for her kids and grand kids.  How putting her own needs aside affects was processed.    Follow Up: 2 weeks

## 2024-05-10 ENCOUNTER — OFFICE VISIT (OUTPATIENT)
Dept: BEHAVIORAL/MENTAL HEALTH CLINIC | Facility: CLINIC | Age: 54
End: 2024-05-10

## 2024-05-10 DIAGNOSIS — Z65.8 PSYCHOSOCIAL STRESSORS: Primary | ICD-10-CM

## 2024-05-10 PROCEDURE — 90834 PSYTX W PT 45 MINUTES: CPT | Performed by: SOCIAL WORKER

## 2024-05-10 NOTE — PROGRESS NOTES
INDIVIDUAL THERAPY NOTE  NAME: Adriana Kurtz    DATE: 5/10/2024    TYPE OF SERVICE: Individual Therapy    LOCATION OF SERVICE: [facility]    DURATION: 50 mins    DIAGNOSIS: :    1. Psychosocial stressors      CHIEF COMPLAINT: increased anxiety due to stressful life circumstances    Mental Status Exam:  Pt was appropriately dressed and groomed.  Pt was 0x4. No unusual mannerisms were noted.  No psychotic symptoms displayed by pt.  Pt was cooperative and engaged in the session.  Insight and judgment were intact.  Denied suicidal or homicidal ideation.  Fund of knowledge and attention span are WNL.  Denies developmental or language difficulties.  Mood/Affect: mildly anxious  Thought Process: coherent    Plan: CBT, DBT, CPT, Humanistic/Client Centered, ACT, Seeking Safety, Psychodynamic, ERP, IFS, and Poly Vagal Theory may be used to address goals.    Summary of Service:  This SW met with the pt face to face in the office.  The pt updated this SW on her mental status.  Goals continued to be addressed using various therapeutic models listed above.  HW is given as deemed appropriate.  Guided questions were asked, support and active listening were provided, distorted thinking was challenged, and strengths were validated and reinforced.  Motivational interviewing questions were asked to promote change.  Other therapeutic techniques were also utilized based upon the pt's needs and issues.    Update: The pt is seeing someone and she feels pressure to leave her current relationship.  The pt has mixed feelings about this and these were identified and processed.  Guided questions were asked.    Goals Addressed:  []Trauma  []Grief   [x]Anxiety/Panic Attacks []Depression    [x]Relational Issues []Self Esteem  []Anger Management  []Other       New Symptoms:   [x]No  []Yes:  Progress Status:  []Improving []Declining [x]Stabilizing []Static  Change in Goals:  [x]No  []Yes    Follow Up:        1/22/2024     4:22 PM

## 2024-05-29 NOTE — PROGRESS NOTES
Courtney Alexander PA-C  Bariatric & [facility] Lapar[facility] Surgery [facility]oscopy  [address], [address]  [place] [address]  Phone [phone]   Fax [phone]    Date of visit: 2024          Name: Adriana Kurtz      MRN: [id number]       : 1970       Age: 53 y.o.    Sex: female        PCP: Gilma Sosa DO     Surgeon: Dr. Kori Barcenas  Procedure: robotic assisted gastric bypass     Surgeon: Torito   DOS: 2023   Procedure: Bypass   Pre-op weight: 271   Ideal body weight: 169   Excess body weight: 102      HPI:    6 months post-op visit after a robotic assisted gastric bypass was done on 2023.   She has lost 22 lbs since her last office visit.    Weight History Graph  Post-Surgical Weight Loss  Date: 24  Height: 175.3 cm (5' 9\")  Weight: 100.2 kg (221 lb)  BMI: 32.63  Weight Change: -22 lbs  Total Weight Change: -50 lbs  % EBWL: 49%     Evaluation of Pre-operative Co-morbid Conditions:    Hypertension RS   GERD Yes, treatment medication- Omeprazole PRN   Hyperlipidemia RS      Clinical Assessment and Physical Exam:    She is doing very well today and is feeling good.  She reports that she has been adhering to a regular diet without difficulty.  She denies any abdominal pain, nausea or vomiting.  She experiences occasional heartburn and takes medication as needed.  She denies fevers, chills, or any problems with the incision sites.  She does not report having problems with constipation.  She is concerned about excess skin.    Protein:  60 grams per day  Fluids:    50 ounces per day  Exercise:  walking, jogging 4x per week for 45 minutes  Denies reflux. Denies dysphagia.  Tolerating Protein first diet without difficulty.    PMH:  Past Medical History:   Diagnosis Date    Abnormal Pap smear of cervix     Adverse effect of anesthesia     metabolizes anesthesia quickly    Anxiety     managed with meds    Arthritis     Depression     managed with

## 2024-05-30 ENCOUNTER — OFFICE VISIT (OUTPATIENT)
Dept: SURGERY | Age: 54
End: 2024-05-30

## 2024-05-30 VITALS
WEIGHT: 221 LBS | HEIGHT: 69 IN | DIASTOLIC BLOOD PRESSURE: 60 MMHG | SYSTOLIC BLOOD PRESSURE: 143 MMHG | BODY MASS INDEX: 32.73 KG/M2 | HEART RATE: 61 BPM

## 2024-05-30 DIAGNOSIS — E66.01 MORBID OBESITY (HCC): Primary | ICD-10-CM

## 2024-05-30 DIAGNOSIS — Z98.84 S/P GASTRIC BYPASS: ICD-10-CM

## 2024-05-30 DIAGNOSIS — E66.9 OBESITY, CLASS I, BMI 30-34.9: ICD-10-CM

## 2024-05-30 DIAGNOSIS — Z71.3 DIETARY COUNSELING: ICD-10-CM

## 2024-05-30 DIAGNOSIS — Z71.82 EXERCISE COUNSELING: ICD-10-CM

## 2024-05-30 PROCEDURE — 3077F SYST BP >= 140 MM HG: CPT | Performed by: PHYSICIAN ASSISTANT

## 2024-05-30 PROCEDURE — 3078F DIAST BP <80 MM HG: CPT | Performed by: PHYSICIAN ASSISTANT

## 2024-05-30 PROCEDURE — 99214 OFFICE O/P EST MOD 30 MIN: CPT | Performed by: PHYSICIAN ASSISTANT

## 2024-05-30 RX ORDER — LAMOTRIGINE 25 MG/1
TABLET ORAL
COMMUNITY
Start: 2024-04-24

## 2024-05-30 NOTE — PROGRESS NOTES
[facility] NUTRITION REASSESSMENT  Assessment:   6 months post-op RYGB. Pt consuming ~60g protein/d and ~50oz fluid/d. Pt is eating at least 3x/d. Pt is drinking water and gatorade zero. Pt is exercising via walking/jogging for 45min 4 x weekly. Pt is taking MVI and Ca supplements as recommended.    Diet Recall:   Breakfast: 2 x eggs   Lunch: Baked chicken   Dinner: Stead, baked potato     Intervention:   Evaluated diet recall  Encouraged continued and increased activity.        Monitoring and Evaluation:  Monitor for continued safe, supervised weight loss for RYGB.   F/U per MD Tierney Platt MBA. RD, LD

## 2024-06-21 ENCOUNTER — OFFICE VISIT (OUTPATIENT)
Dept: BEHAVIORAL/MENTAL HEALTH CLINIC | Facility: CLINIC | Age: 54
End: 2024-06-21

## 2024-06-21 DIAGNOSIS — Z65.8 PSYCHOSOCIAL STRESSORS: Primary | ICD-10-CM

## 2024-06-21 DIAGNOSIS — F41.9 ANXIETY DISORDER, UNSPECIFIED TYPE: ICD-10-CM

## 2024-06-21 DIAGNOSIS — Z87.828 HISTORY OF TRAUMA: ICD-10-CM

## 2024-06-21 PROCEDURE — 90837 PSYTX W PT 60 MINUTES: CPT | Performed by: SOCIAL WORKER

## 2024-06-21 NOTE — PROGRESS NOTES
INDIVIDUAL THERAPY NOTE  NAME: Adriana Kurtz    DATE: 6/21/2024    TYPE OF SERVICE: Individual Therapy    LOCATION OF SERVICE: [facility]    DURATION: 60 mins    DIAGNOSIS: :    1. Psychosocial stressors    2. Anxiety disorder, unspecified type    3. History of trauma        CHIEF COMPLAINT: increased stress due to psycho social stressors and relational conflict      Mental Status Exam:  Pt was appropriately dressed and groomed.  Pt was 0x4. No unusual mannerisms were noted.  No psychotic symptoms displayed by pt.  Pt was cooperative and engaged in the session.  Insight and judgment were intact.  Denied suicidal or homicidal ideation.  Fund of knowledge and attention span are WNL.  Denies developmental or language difficulties.  Mood/Affect: even with congruent affect  Thought Process: coherent    Plan: CBT, DBT, CPT, Humanistic/Client Centered, ACT, Seeking Safety, Psychodynamic, ERP, IFS, and Poly Vagal Theory may be used to address goals.    Summary of Service:  This SW met with the pt face to face in the office.  The pt updated this SW on her mental status.  Goals continued to be addressed using various therapeutic models listed above.  HW is given as deemed appropriate.  Guided questions were asked, support and active listening were provided, distorted thinking was challenged, and strengths were validated and reinforced.  Motivational interviewing questions were asked to promote change.  Other therapeutic techniques were also utilized based upon the pt's needs and issues.    Update: Pt reports that she and her family recently went on vacation and she had a great time.  This solidified that she can not leave her family.  She and Matt are not currently talking due to a recent disagreement.  The pt is dissatisfied with many elements of her marriage but she does feel she can break up the home it took so long to build.  Matt wants to leave and they are at a standstill.  Guided questions were asked to

## 2024-07-23 ENCOUNTER — OFFICE VISIT (OUTPATIENT)
Dept: FAMILY MEDICINE CLINIC | Facility: CLINIC | Age: 54
End: 2024-07-23

## 2024-07-23 VITALS
WEIGHT: 215 LBS | HEART RATE: 58 BPM | BODY MASS INDEX: 31.84 KG/M2 | OXYGEN SATURATION: 97 % | DIASTOLIC BLOOD PRESSURE: 72 MMHG | RESPIRATION RATE: 16 BRPM | HEIGHT: 69 IN | SYSTOLIC BLOOD PRESSURE: 118 MMHG

## 2024-07-23 DIAGNOSIS — J30.2 SEASONAL ALLERGIES: ICD-10-CM

## 2024-07-23 DIAGNOSIS — R19.7 ACUTE DIARRHEA: Primary | ICD-10-CM

## 2024-07-23 DIAGNOSIS — R19.4 BOWEL HABIT CHANGES: ICD-10-CM

## 2024-07-23 DIAGNOSIS — E66.9 OBESITY (BMI 30.0-34.9): ICD-10-CM

## 2024-07-23 DIAGNOSIS — Z87.891 PERSONAL HISTORY OF TOBACCO USE: ICD-10-CM

## 2024-07-23 DIAGNOSIS — I10 ESSENTIAL HYPERTENSION: ICD-10-CM

## 2024-07-23 DIAGNOSIS — N39.41 URGE INCONTINENCE: ICD-10-CM

## 2024-07-23 DIAGNOSIS — Z12.31 ENCOUNTER FOR SCREENING MAMMOGRAM FOR BREAST CANCER: ICD-10-CM

## 2024-07-23 PROBLEM — E66.812 OBESITY, CLASS II, BMI 35-39.9: Status: RESOLVED | Noted: 2023-09-12 | Resolved: 2024-07-23

## 2024-07-23 PROBLEM — E66.811 OBESITY (BMI 30.0-34.9): Status: ACTIVE | Noted: 2019-01-08

## 2024-07-23 PROBLEM — Z86.79 HISTORY OF HYPERTENSION: Status: ACTIVE | Noted: 2023-07-18

## 2024-07-23 PROCEDURE — 3078F DIAST BP <80 MM HG: CPT | Performed by: FAMILY MEDICINE

## 2024-07-23 PROCEDURE — 3074F SYST BP LT 130 MM HG: CPT | Performed by: FAMILY MEDICINE

## 2024-07-23 PROCEDURE — 99214 OFFICE O/P EST MOD 30 MIN: CPT | Performed by: FAMILY MEDICINE

## 2024-07-23 RX ORDER — MONTELUKAST SODIUM 10 MG/1
TABLET ORAL
Qty: 90 TABLET | Refills: 2 | Status: SHIPPED | OUTPATIENT
Start: 2024-07-23

## 2024-07-23 RX ORDER — LAMOTRIGINE 100 MG/1
100 TABLET ORAL DAILY
COMMUNITY
Start: 2024-06-18

## 2024-07-23 RX ORDER — DEXTROAMPHETAMINE SACCHARATE, AMPHETAMINE ASPARTATE MONOHYDRATE, DEXTROAMPHETAMINE SULFATE AND AMPHETAMINE SULFATE 5; 5; 5; 5 MG/1; MG/1; MG/1; MG/1
1 CAPSULE, EXTENDED RELEASE ORAL EVERY MORNING
COMMUNITY
Start: 2024-07-10

## 2024-07-23 ASSESSMENT — ENCOUNTER SYMPTOMS
NAUSEA: 0
ABDOMINAL PAIN: 0
COUGH: 0
VOMITING: 0
BLOOD IN STOOL: 0
SHORTNESS OF BREATH: 0

## 2024-07-23 NOTE — PROGRESS NOTES
[facility]  Gilma Espinoza Ian, DO  [address] [place] [address]   No:  [phone]  Fax:  [phone]        Assessment/Plan:   Adriana was seen today for follow-up and diarrhea.    Diagnoses and all orders for this visit:    Acute diarrhea  Symptoms have resolved the last 48 hours.  Continue with water and electrolytes.    Bowel habit changes  Patient with soft bowel movements for the past month which is unusual for her.  Advised trial of Metamucil and probiotic to see if this makes a difference.  She is not having any alarm symptoms.  Advised patient this continues may need to see gastroenterology or discussed with her bariatric surgeon for possible colonoscopy.    Urge incontinence  resolved with weight loss.  No longer requiring Vesicare    Essential hypertension  Resolved with weight loss.  No longer requiring amlodipine    Obesity (BMI 30.0-34.9)  Continuing to lose weight after bariatric surgery.  She is following with bariatric surgeon    Seasonal allergies  Continue montelukast, Allegra  -     montelukast (SINGULAIR) 10 MG tablet; TAKE 1 TABLET BY MOUTH AT NIGHT  FOR ALLERGIES    Encounter for screening mammogram for breast cancer  -     [facility] DIGITAL SCREEN BILATERAL [[id number]]; Future    Personal history of tobacco use  -     CT Lung Screen (Initial/Annual/Baseline); Future        Labs:  No results found for this visit on 07/23/24.            Adriana Kurtz is a 53 y.o. female who is seen for evaluation of   Chief Complaint   Patient presents with    Follow-up     6 month follow up: HTN     Diarrhea     Pt stated having loose stool and bloating   Onset one month   Pt denies abdominal cramping        HPI:   Loose stool light brown in color about 3 times per day for the past month.  Some bloating without nausea, vomiting or pain.  This past weekend she had very loose very watery stool numerous times per day.  This all resolved the morning of July 21, 2024.  Nobody

## 2024-08-06 ENCOUNTER — OFFICE VISIT (OUTPATIENT)
Dept: BEHAVIORAL/MENTAL HEALTH CLINIC | Facility: CLINIC | Age: 54
End: 2024-08-06

## 2024-08-06 DIAGNOSIS — Z65.8 PSYCHOSOCIAL STRESSORS: Primary | ICD-10-CM

## 2024-08-06 DIAGNOSIS — Z87.828 HISTORY OF TRAUMA: ICD-10-CM

## 2024-08-06 DIAGNOSIS — F41.9 ANXIETY DISORDER, UNSPECIFIED TYPE: ICD-10-CM

## 2024-08-06 PROCEDURE — 90837 PSYTX W PT 60 MINUTES: CPT | Performed by: SOCIAL WORKER

## 2024-08-06 NOTE — PROGRESS NOTES
INDIVIDUAL THERAPY NOTE  NAME: Adriana Kurtz    DATE: 8/6/2024    TYPE OF SERVICE: Individual Therapy    LOCATION OF SERVICE: [facility]    DURATION: 55 mins    DIAGNOSIS: :    1. Psychosocial stressors    2. Anxiety disorder, unspecified type    3. History of trauma        New Symptoms:   [x]No  []Yes:  Progress Status:  [x]Improving []Declining []Stable []Static  Change in Goals:  [x]No  []Yes    Chief Complaint: symptoms associated with relational conflict and other psycho social stressors    Plan: CBT, DBT, CPT, Humanistic/Client Centered, ACT, Seeking Safety, Psychodynamic, ERP, IFS, and Poly Vagal Theory may be used to address symptoms associated with pt diagnosis and goals.    Mental Status Exam:  Pt was appropriately dressed and groomed.  Pt was 0x4. No unusual mannerisms were noted.  No psychotic symptoms displayed by pt.  Pt was cooperative and engaged in the session.  Insight and judgment were intact.  Denied suicidal or homicidal ideation.  Fund of knowledge and attention span are WNL.  Denies developmental or language difficulties.  Mood/Affect: even with congruent affect  Thought Process: linear and coherent    Summary of Service:  This SW met with the pt face to face in the office.  The pt updated this SW on her mental status.  Goals continued to be addressed using various therapeutic models listed above.  HW is given as deemed appropriate.  Guided questions were asked, support and active listening were provided, distorted thinking was challenged, and strengths were validated and reinforced.  Motivational interviewing questions were asked to promote change.  Other therapeutic techniques were also utilized based upon the pt's needs and issues.    Update:  The pt appears calm and at peace to day.  She states she has not had any contact with her friend, Matt.  This is a loss, but it is also a relief.  The pt feels a reduction of stress since this relationship ended.  The pt has committed 
Curettage Text: The wound bed was treated with curettage after the biopsy was performed.
Post-Care Instructions: I reviewed with the patient in detail post-care instructions. Patient is to keep the biopsy site dry overnight, and then apply bacitracin twice daily until healed. Patient may apply hydrogen peroxide soaks to remove any crusting.
Hide Second Anesthesia?: No
Depth Of Biopsy: dermis
Anesthesia Volume In Cc: 0.5
Dressing: bandage
Additional Anesthesia Volume In Cc (Will Not Render If 0): 0
Silver Nitrate Text: The wound bed was treated with silver nitrate after the biopsy was performed.
Billing Type: Third-Party Bill
Electrodesiccation And Curettage Text: The wound bed was treated with electrodesiccation and curettage after the biopsy was performed.
Was A Bandage Applied: Yes
Information: Selecting Yes will display possible errors in your note based on the variables you have selected. This validation is only offered as a suggestion for you. PLEASE NOTE THAT THE VALIDATION TEXT WILL BE REMOVED WHEN YOU FINALIZE YOUR NOTE. IF YOU WANT TO FAX A PRELIMINARY NOTE YOU WILL NEED TO TOGGLE THIS TO 'NO' IF YOU DO NOT WANT IT IN YOUR FAXED NOTE.
Hemostasis: Drysol
Biopsy Type: H and E
Electrodesiccation Text: The wound bed was treated with electrodesiccation after the biopsy was performed.
Notification Instructions: Patient will be notified of biopsy results. However, patient instructed to call the office if not contacted within 2 weeks.
Detail Level: Detailed
Consent: Written consent was obtained and risks were reviewed including but not limited to scarring, infection, bleeding, scabbing, incomplete removal, nerve damage and allergy to anesthesia.
Wound Care: Petrolatum
Anesthesia Type: 1% lidocaine with epinephrine
Type Of Destruction Used: Curettage
Biopsy Method: Dermablade
Cryotherapy Text: The wound bed was treated with cryotherapy after the biopsy was performed.

## 2024-11-26 NOTE — PROGRESS NOTES
Kori Barcenas MD   Bariatric [facility]  [address], [address]  [place] [address]  Phone [phone]   Fax [phone]      Date of visit: 2024          Name: Adriana Kurtz      MRN: [id number]       : 1970       Age: 54 y.o.    Sex: female        PCP: Gilma Sosa DO     CC:    Chief Complaint   Patient presents with    Follow-up     FU - Bypass 2023       HPI:    1 year post-op visit after a robotic assisted gastric bypass was done on 2023.   She has lost 9lbs since her last office visit.    Weight History Graph    Surgeon: Torito   DOS: 2023   Procedure: Bypass   Pre-op weight: 271   Ideal body weight: 169   Excess body weight: 102      Last Surgical Weight Loss:      12/3/2023    10:07 PM 2023    11:45 AM 2023    10:10 AM 2024    10:50 AM 2024     3:26 PM 2024    10:53 AM   Surgical Weight Loss Tracker   Ideal Body Weight 169 lb        Surgery Date 2023        Pre-Surgical Height 5' 9\"        Pre-Surgical Weight 271 lb        Pre Surgery BMI 40.02        Weight to Lose 102 lb        Date  2023   Height  5' 9\" 5' 9\" 5' 9\" 5' 9\" 5' 9\"   Weight  254 lb 248 lb 243 lb 221 lb 212 lb   BMI  37.51 36.62 35.88 32.63 31.3   Weight Change  -17 lb -6 lb -5 lb -22 lb -9 lb   Total Weight Change  -17 lb -23 lb -28 lb -50 lb -59 lb   % EBWL  17% 23% 27% 49% 58%        Evaluation of pre-operative co-morbid conditions:    Hypertension RS   GERD Yes, treatment medication- Omeprazole PRN   Hyperlipidemia RS      Clinical Assessment and Physical Exam:    She is doing very well today and is feeling good.  She reports that she has been adhering to protein first diet without difficulty.  She denies any abdominal pain, nausea or vomiting or heartburn. She does not report having problems with constipation.  She reports walking.      Protein:

## 2024-11-27 ENCOUNTER — OFFICE VISIT (OUTPATIENT)
Dept: SURGERY | Age: 54
End: 2024-11-27
Payer: COMMERCIAL

## 2024-11-27 VITALS
HEART RATE: 54 BPM | WEIGHT: 212 LBS | DIASTOLIC BLOOD PRESSURE: 58 MMHG | BODY MASS INDEX: 31.4 KG/M2 | SYSTOLIC BLOOD PRESSURE: 132 MMHG | HEIGHT: 69 IN

## 2024-11-27 DIAGNOSIS — Z98.84 S/P GASTRIC BYPASS: ICD-10-CM

## 2024-11-27 DIAGNOSIS — E66.01 MORBID OBESITY: Primary | ICD-10-CM

## 2024-11-27 DIAGNOSIS — E66.811 OBESITY, CLASS I, BMI 30-34.9: ICD-10-CM

## 2024-11-27 DIAGNOSIS — Z71.3 NUTRITIONAL COUNSELING: ICD-10-CM

## 2024-11-27 DIAGNOSIS — Z71.82 EXERCISE COUNSELING: ICD-10-CM

## 2024-11-27 DIAGNOSIS — Z71.3 DIETARY COUNSELING: ICD-10-CM

## 2024-11-27 DIAGNOSIS — Z13.21 ENCOUNTER FOR VITAMIN DEFICIENCY SCREENING: ICD-10-CM

## 2024-11-27 PROCEDURE — 99215 OFFICE O/P EST HI 40 MIN: CPT | Performed by: SURGERY

## 2024-11-27 NOTE — PROGRESS NOTES
[facility] NUTRITION REASSESSMENT  Assessment:   1 year post-op RYGB. Pt consuming ~50g protein/d (on average) and ~50-60oz fluid/d. Pt is eating at least 3x/d. Pt is drinking water, coffee, OJ. Pt is exercising via yoga and walking. Pt is taking MVI and Ca supplements as recommended.    Diet Recall:   Breakfast: 1/2 chx biscuit   Lunch: Soup   Dinner: None     Intervention:   Evaluated diet recall and identified modifications.  Encouraged increased lean protein intake  Encouraged continued and increased activity.        Monitoring and Evaluation:  Monitor for continued safe, supervised weight loss for RYGB.   F/U per MD Tierney Platt MBA. RD, LD

## 2024-12-03 ENCOUNTER — HOSPITAL ENCOUNTER (OUTPATIENT)
Dept: CT IMAGING | Age: 54
Discharge: HOME | End: 2024-12-06
Attending: FAMILY MEDICINE
Payer: COMMERCIAL

## 2024-12-03 DIAGNOSIS — Z87.891 PERSONAL HISTORY OF TOBACCO USE: ICD-10-CM

## 2024-12-03 PROCEDURE — 71271 CT THORAX LUNG CANCER SCR C-: CPT

## (undated) DEVICE — APPLICATOR MEDICATED 26 CC SOLUTION HI LT ORNG CHLORAPREP

## (undated) DEVICE — ARM DRAPE

## (undated) DEVICE — REDUCER: Brand: ENDOWRIST

## (undated) DEVICE — PUMP SUC IRR TBNG L10FT W/ HNDPC ASSEMB STRYKEFLOW 2

## (undated) DEVICE — LIQUIBAND RAPID ADHESIVE 36/CS 0.8ML: Brand: MEDLINE

## (undated) DEVICE — AIRLIFE™ OXYGEN TUBING 7 FEET (2.1 M) CRUSH RESISTANT OXYGEN TUBING, VINYL TIPPED: Brand: AIRLIFE™

## (undated) DEVICE — GAUZE,SPONGE,4"X4",12PLY,WOVEN,NS,LF: Brand: MEDLINE

## (undated) DEVICE — YANKAUER,BULB TIP,W/O VENT,RIGID,STERILE: Brand: MEDLINE

## (undated) DEVICE — SOLUTION IRRIG 1000ML STRL H2O USP PLAS POUR BTL

## (undated) DEVICE — STAPLER 60 RELOAD BLUE: Brand: SUREFORM

## (undated) DEVICE — MARKER SURG SKIN UTIL BLK REG TIP NONSMEARING W/ 6IN RUL

## (undated) DEVICE — KIT,ANTI FOG,W/SPONGE & FLUID,SOFT PACK: Brand: MEDLINE

## (undated) DEVICE — JELLY LUBRICATING 10GM PREFIL SYR LUBE

## (undated) DEVICE — SUTURE VCRL SZ 2-0 L27IN ABSRB VLT L26MM SH 1/2 CIR J317H

## (undated) DEVICE — BLADELESS OBTURATOR: Brand: WECK VISTA

## (undated) DEVICE — GLOVE SURG SZ 65 THK91MIL LTX FREE SYN POLYISOPRENE

## (undated) DEVICE — BINDER ABD M/L H12IN FOR 46-62IN WHT 4 SLD PNL DSGN HOOP

## (undated) DEVICE — CANISTER, RIGID, 2000CC: Brand: MEDLINE INDUSTRIES, INC.

## (undated) DEVICE — KIT PROCEDURE SURG T AND A ORAL TOTE

## (undated) DEVICE — STAPLER 60: Brand: SUREFORM

## (undated) DEVICE — SUTURE PERMA-HAND SZ 2-0 L30IN NONABSORBABLE BLK L26MM SH K833H

## (undated) DEVICE — APPLICATOR LAP 35 CM 2 RIGID VISTASEAL

## (undated) DEVICE — GUARD TEETH AD NYL FOR LARYNSCP POS PROTCT

## (undated) DEVICE — STAPLER 60 RELOAD WHITE: Brand: SUREFORM

## (undated) DEVICE — CONNECTOR TBNG OD5-7MM O2 END DISP

## (undated) DEVICE — GLOVE ORANGE PI 7 1/2   MSG9075

## (undated) DEVICE — TIP COVER ACCESSORY

## (undated) DEVICE — SUTURE SZ 0 27IN 5/8 CIR UR-6  TAPER PT VIOLET ABSRB VICRYL J603H

## (undated) DEVICE — NEEDLE SYR 18GA L1.5IN RED PLAS HUB S STL BLNT FILL W/O

## (undated) DEVICE — VESSEL SEALER EXTEND: Brand: ENDOWRIST

## (undated) DEVICE — LUBE JELLY FOIL PACK 1.4 OZ: Brand: MEDLINE INDUSTRIES, INC.

## (undated) DEVICE — SYRINGE MED 10ML LUERLOCK TIP W/O SFTY DISP

## (undated) DEVICE — BLOCK BITE AD 60FR W/ VELC STRP ADDRESSES MOST PT AND

## (undated) DEVICE — GARMENT,MEDLINE,DVT,INT,CALF,LG, GEN2: Brand: MEDLINE

## (undated) DEVICE — CONNECTOR TBNG WHT PLAS SUCT STR 5IN1 LTWT W/ M CONN

## (undated) DEVICE — PAD PT POS 36 IN SURGYPAD DISP

## (undated) DEVICE — SYRINGE, LUER SLIP, STERILE, 60ML: Brand: MEDLINE

## (undated) DEVICE — SEAL

## (undated) DEVICE — SUTURE MCRYL SZ 4-0 L27IN ABSRB UD L19MM PS-2 1/2 CIR PRIM Y426H

## (undated) DEVICE — SYRINGE MED 3ML CLR PLAS STD N CTRL LUERLOCK TIP DISP

## (undated) DEVICE — CONTAINER FORMALIN PREFILLED 10% NBF 60ML

## (undated) DEVICE — GLOVE SURG SZ 7 L12IN FNGR THK79MIL GRN LTX FREE

## (undated) DEVICE — SINGLE BASIN: Brand: CARDINAL HEALTH

## (undated) DEVICE — SINGLE PORT MANIFOLD: Brand: NEPTUNE 2

## (undated) DEVICE — CANNULA NSL ORAL AD FOR CAPNOFLEX CO2 O2 AIRLFE

## (undated) DEVICE — FORCEPS BX L240CM JAW DIA2.8MM L CAP W/ NDL MIC MESH TOOTH

## (undated) DEVICE — KENDALL RADIOLUCENT FOAM MONITORING ELECTRODE RECTANGULAR SHAPE: Brand: KENDALL

## (undated) DEVICE — GENERAL ROBOTIC: Brand: MEDLINE INDUSTRIES, INC.

## (undated) DEVICE — COLUMN DRAPE